# Patient Record
Sex: MALE | NOT HISPANIC OR LATINO | URBAN - METROPOLITAN AREA
[De-identification: names, ages, dates, MRNs, and addresses within clinical notes are randomized per-mention and may not be internally consistent; named-entity substitution may affect disease eponyms.]

---

## 2017-01-12 ENCOUNTER — OFFICE VISIT - HEALTHEAST (OUTPATIENT)
Dept: FAMILY MEDICINE | Facility: CLINIC | Age: 62
End: 2017-01-12

## 2017-01-12 DIAGNOSIS — I25.10 CORONARY ATHEROSCLEROSIS: ICD-10-CM

## 2017-01-12 DIAGNOSIS — I10 ESSENTIAL HYPERTENSION, BENIGN: ICD-10-CM

## 2017-01-12 DIAGNOSIS — E66.9 OBESITY: ICD-10-CM

## 2017-01-12 DIAGNOSIS — E78.00 PURE HYPERCHOLESTEROLEMIA: ICD-10-CM

## 2017-01-12 DIAGNOSIS — G47.33 OBSTRUCTIVE SLEEP APNEA (ADULT) (PEDIATRIC): ICD-10-CM

## 2017-01-12 DIAGNOSIS — E55.9 VITAMIN D DEFICIENCY: ICD-10-CM

## 2017-01-12 LAB
CHOLEST SERPL-MCNC: 118 MG/DL
FASTING STATUS PATIENT QL REPORTED: YES
HDLC SERPL-MCNC: 39 MG/DL
LDLC SERPL CALC-MCNC: 63 MG/DL
TRIGL SERPL-MCNC: 82 MG/DL

## 2017-01-12 ASSESSMENT — MIFFLIN-ST. JEOR: SCORE: 2087.61

## 2017-01-13 ENCOUNTER — COMMUNICATION - HEALTHEAST (OUTPATIENT)
Dept: FAMILY MEDICINE | Facility: CLINIC | Age: 62
End: 2017-01-13

## 2017-01-27 ENCOUNTER — COMMUNICATION - HEALTHEAST (OUTPATIENT)
Dept: FAMILY MEDICINE | Facility: CLINIC | Age: 62
End: 2017-01-27

## 2017-01-27 DIAGNOSIS — F33.41 RECURRENT MAJOR DEPRESSION IN PARTIAL REMISSION (H): ICD-10-CM

## 2017-01-30 ENCOUNTER — RECORDS - HEALTHEAST (OUTPATIENT)
Dept: ADMINISTRATIVE | Facility: OTHER | Age: 62
End: 2017-01-30

## 2017-02-19 ENCOUNTER — COMMUNICATION - HEALTHEAST (OUTPATIENT)
Dept: FAMILY MEDICINE | Facility: CLINIC | Age: 62
End: 2017-02-19

## 2017-02-19 DIAGNOSIS — E87.6 POTASSIUM DEFICIENCY: ICD-10-CM

## 2017-02-27 ENCOUNTER — OFFICE VISIT - HEALTHEAST (OUTPATIENT)
Dept: FAMILY MEDICINE | Facility: CLINIC | Age: 62
End: 2017-02-27

## 2017-02-27 DIAGNOSIS — M79.89 LEG SWELLING: ICD-10-CM

## 2017-02-27 DIAGNOSIS — I10 ESSENTIAL HYPERTENSION, BENIGN: ICD-10-CM

## 2017-03-10 ENCOUNTER — RECORDS - HEALTHEAST (OUTPATIENT)
Dept: ADMINISTRATIVE | Facility: OTHER | Age: 62
End: 2017-03-10

## 2017-03-10 ENCOUNTER — AMBULATORY - HEALTHEAST (OUTPATIENT)
Dept: LAB | Facility: CLINIC | Age: 62
End: 2017-03-10

## 2017-03-10 ENCOUNTER — OFFICE VISIT - HEALTHEAST (OUTPATIENT)
Dept: SURGERY | Facility: CLINIC | Age: 62
End: 2017-03-10

## 2017-03-10 ENCOUNTER — COMMUNICATION - HEALTHEAST (OUTPATIENT)
Dept: SURGERY | Facility: CLINIC | Age: 62
End: 2017-03-10

## 2017-03-10 DIAGNOSIS — H91.90 HARD OF HEARING: ICD-10-CM

## 2017-03-10 DIAGNOSIS — E66.01 MORBID OBESITY (H): ICD-10-CM

## 2017-03-10 DIAGNOSIS — M54.9 CHRONIC BACK PAIN: ICD-10-CM

## 2017-03-10 DIAGNOSIS — R60.0 LOWER LEG EDEMA: ICD-10-CM

## 2017-03-10 DIAGNOSIS — G89.29 CHRONIC BACK PAIN: ICD-10-CM

## 2017-03-10 DIAGNOSIS — M47.816 DEGENERATIVE JOINT DISEASE (DJD) OF LUMBAR SPINE: ICD-10-CM

## 2017-03-10 LAB — HBA1C MFR BLD: 6.9 % (ref 3.5–6)

## 2017-03-10 ASSESSMENT — MIFFLIN-ST. JEOR: SCORE: 2089.31

## 2017-03-13 ENCOUNTER — AMBULATORY - HEALTHEAST (OUTPATIENT)
Dept: SURGERY | Facility: CLINIC | Age: 62
End: 2017-03-13

## 2017-03-15 ENCOUNTER — AMBULATORY - HEALTHEAST (OUTPATIENT)
Dept: SURGERY | Facility: CLINIC | Age: 62
End: 2017-03-15

## 2017-03-15 ENCOUNTER — OFFICE VISIT - HEALTHEAST (OUTPATIENT)
Dept: SURGERY | Facility: CLINIC | Age: 62
End: 2017-03-15

## 2017-03-15 DIAGNOSIS — E60 LOW ZINC LEVEL: ICD-10-CM

## 2017-03-15 DIAGNOSIS — E66.01 MORBID OBESITY (H): ICD-10-CM

## 2017-03-16 ENCOUNTER — COMMUNICATION - HEALTHEAST (OUTPATIENT)
Dept: FAMILY MEDICINE | Facility: CLINIC | Age: 62
End: 2017-03-16

## 2017-03-16 DIAGNOSIS — E78.5 HYPERLIPIDEMIA: ICD-10-CM

## 2017-03-20 ENCOUNTER — COMMUNICATION - HEALTHEAST (OUTPATIENT)
Dept: FAMILY MEDICINE | Facility: CLINIC | Age: 62
End: 2017-03-20

## 2017-03-20 DIAGNOSIS — I25.10 CAD (CORONARY ARTERY DISEASE): ICD-10-CM

## 2017-04-06 ENCOUNTER — OFFICE VISIT - HEALTHEAST (OUTPATIENT)
Dept: SURGERY | Facility: CLINIC | Age: 62
End: 2017-04-06

## 2017-04-06 DIAGNOSIS — E66.01 MORBID OBESITY (H): ICD-10-CM

## 2017-04-11 ENCOUNTER — OFFICE VISIT - HEALTHEAST (OUTPATIENT)
Dept: SURGERY | Facility: CLINIC | Age: 62
End: 2017-04-11

## 2017-04-11 ENCOUNTER — RECORDS - HEALTHEAST (OUTPATIENT)
Dept: ADMINISTRATIVE | Facility: OTHER | Age: 62
End: 2017-04-11

## 2017-04-11 DIAGNOSIS — E66.9 OBESITY (BMI 35.0-39.9 WITHOUT COMORBIDITY): ICD-10-CM

## 2017-04-11 DIAGNOSIS — Z71.3 NUTRITIONAL COUNSELING: ICD-10-CM

## 2017-04-11 ASSESSMENT — MIFFLIN-ST. JEOR: SCORE: 2018.54

## 2017-05-09 ENCOUNTER — AMBULATORY - HEALTHEAST (OUTPATIENT)
Dept: LAB | Facility: CLINIC | Age: 62
End: 2017-05-09

## 2017-05-09 ENCOUNTER — COMMUNICATION - HEALTHEAST (OUTPATIENT)
Dept: LAB | Facility: CLINIC | Age: 62
End: 2017-05-09

## 2017-05-09 DIAGNOSIS — I10 HTN (HYPERTENSION): ICD-10-CM

## 2017-05-10 ENCOUNTER — COMMUNICATION - HEALTHEAST (OUTPATIENT)
Dept: FAMILY MEDICINE | Facility: CLINIC | Age: 62
End: 2017-05-10

## 2017-05-11 ENCOUNTER — OFFICE VISIT - HEALTHEAST (OUTPATIENT)
Dept: SURGERY | Facility: CLINIC | Age: 62
End: 2017-05-11

## 2017-05-11 DIAGNOSIS — E66.9 OBESITY WITH BODY MASS INDEX OF 30.0-39.9: ICD-10-CM

## 2017-05-11 DIAGNOSIS — Z71.3 NUTRITIONAL COUNSELING: ICD-10-CM

## 2017-05-11 ASSESSMENT — MIFFLIN-ST. JEOR: SCORE: 1971.82

## 2017-05-12 ENCOUNTER — AMBULATORY - HEALTHEAST (OUTPATIENT)
Dept: SURGERY | Facility: CLINIC | Age: 62
End: 2017-05-12

## 2017-05-23 ENCOUNTER — COMMUNICATION - HEALTHEAST (OUTPATIENT)
Dept: FAMILY MEDICINE | Facility: CLINIC | Age: 62
End: 2017-05-23

## 2017-05-23 DIAGNOSIS — I10 ESSENTIAL HYPERTENSION, BENIGN: ICD-10-CM

## 2017-05-24 ENCOUNTER — AMBULATORY - HEALTHEAST (OUTPATIENT)
Dept: SURGERY | Facility: CLINIC | Age: 62
End: 2017-05-24

## 2017-05-24 ENCOUNTER — OFFICE VISIT - HEALTHEAST (OUTPATIENT)
Dept: SURGERY | Facility: CLINIC | Age: 62
End: 2017-05-24

## 2017-05-24 DIAGNOSIS — E66.01 MORBID OBESITY (H): ICD-10-CM

## 2017-05-24 DIAGNOSIS — I10 ESSENTIAL HYPERTENSION: ICD-10-CM

## 2017-05-24 ASSESSMENT — MIFFLIN-ST. JEOR: SCORE: 2005.84

## 2017-05-25 ENCOUNTER — AMBULATORY - HEALTHEAST (OUTPATIENT)
Dept: SURGERY | Facility: CLINIC | Age: 62
End: 2017-05-25

## 2017-05-31 ENCOUNTER — RECORDS - HEALTHEAST (OUTPATIENT)
Dept: ADMINISTRATIVE | Facility: OTHER | Age: 62
End: 2017-05-31

## 2017-06-09 ENCOUNTER — AMBULATORY - HEALTHEAST (OUTPATIENT)
Dept: SURGERY | Facility: CLINIC | Age: 62
End: 2017-06-09

## 2017-06-19 ENCOUNTER — COMMUNICATION - HEALTHEAST (OUTPATIENT)
Dept: FAMILY MEDICINE | Facility: CLINIC | Age: 62
End: 2017-06-19

## 2017-06-19 DIAGNOSIS — I25.10 CAD (CORONARY ARTERY DISEASE): ICD-10-CM

## 2017-06-20 ENCOUNTER — AMBULATORY - HEALTHEAST (OUTPATIENT)
Dept: SURGERY | Facility: CLINIC | Age: 62
End: 2017-06-20

## 2017-06-20 DIAGNOSIS — Z01.818 PRE-OP TESTING: ICD-10-CM

## 2017-06-25 ENCOUNTER — COMMUNICATION - HEALTHEAST (OUTPATIENT)
Dept: FAMILY MEDICINE | Facility: CLINIC | Age: 62
End: 2017-06-25

## 2017-06-25 DIAGNOSIS — E78.5 HYPERLIPIDEMIA: ICD-10-CM

## 2017-06-29 ENCOUNTER — COMMUNICATION - HEALTHEAST (OUTPATIENT)
Dept: FAMILY MEDICINE | Facility: CLINIC | Age: 62
End: 2017-06-29

## 2017-06-29 DIAGNOSIS — I10 ESSENTIAL HYPERTENSION, BENIGN: ICD-10-CM

## 2017-07-07 ENCOUNTER — RECORDS - HEALTHEAST (OUTPATIENT)
Dept: ADMINISTRATIVE | Facility: OTHER | Age: 62
End: 2017-07-07

## 2017-07-17 ENCOUNTER — RECORDS - HEALTHEAST (OUTPATIENT)
Dept: GENERAL RADIOLOGY | Facility: CLINIC | Age: 62
End: 2017-07-17

## 2017-07-17 ENCOUNTER — OFFICE VISIT - HEALTHEAST (OUTPATIENT)
Dept: FAMILY MEDICINE | Facility: CLINIC | Age: 62
End: 2017-07-17

## 2017-07-17 DIAGNOSIS — E66.9 OBESITY: ICD-10-CM

## 2017-07-17 DIAGNOSIS — E78.00 PURE HYPERCHOLESTEROLEMIA: ICD-10-CM

## 2017-07-17 DIAGNOSIS — Z01.818 PRE-OP TESTING: ICD-10-CM

## 2017-07-17 DIAGNOSIS — E55.9 VITAMIN D DEFICIENCY: ICD-10-CM

## 2017-07-17 DIAGNOSIS — I10 ESSENTIAL HYPERTENSION, BENIGN: ICD-10-CM

## 2017-07-17 DIAGNOSIS — E11.9 TYPE 2 DIABETES MELLITUS (H): ICD-10-CM

## 2017-07-17 DIAGNOSIS — Z01.818 ENCOUNTER FOR OTHER PREPROCEDURAL EXAMINATION: ICD-10-CM

## 2017-07-17 DIAGNOSIS — I25.10 CORONARY ARTERY DISEASE: ICD-10-CM

## 2017-07-17 DIAGNOSIS — Z01.810 PREOP CARDIOVASCULAR EXAM: ICD-10-CM

## 2017-07-17 LAB
ATRIAL RATE - MUSE: 48 BPM
CHOLEST SERPL-MCNC: 122 MG/DL
DIASTOLIC BLOOD PRESSURE - MUSE: NORMAL MMHG
FASTING STATUS PATIENT QL REPORTED: YES
HBA1C MFR BLD: 6.7 % (ref 3.5–6)
HDLC SERPL-MCNC: 37 MG/DL
INTERPRETATION ECG - MUSE: NORMAL
LDLC SERPL CALC-MCNC: 63 MG/DL
P AXIS - MUSE: 25 DEGREES
PR INTERVAL - MUSE: 156 MS
QRS DURATION - MUSE: 86 MS
QT - MUSE: 448 MS
QTC - MUSE: 400 MS
R AXIS - MUSE: 34 DEGREES
SYSTOLIC BLOOD PRESSURE - MUSE: NORMAL MMHG
T AXIS - MUSE: 53 DEGREES
TRIGL SERPL-MCNC: 109 MG/DL
VENTRICULAR RATE- MUSE: 48 BPM

## 2017-07-17 ASSESSMENT — MIFFLIN-ST. JEOR: SCORE: 1979.88

## 2017-07-20 ENCOUNTER — AMBULATORY - HEALTHEAST (OUTPATIENT)
Dept: SURGERY | Facility: CLINIC | Age: 62
End: 2017-07-20

## 2017-07-20 DIAGNOSIS — Z98.84 BARIATRIC SURGERY STATUS: ICD-10-CM

## 2017-07-20 DIAGNOSIS — Z98.84 S/P BARIATRIC SURGERY: ICD-10-CM

## 2017-07-20 DIAGNOSIS — Z71.3 DIETARY COUNSELING: ICD-10-CM

## 2017-07-20 DIAGNOSIS — R63.4 RAPID WEIGHT LOSS: ICD-10-CM

## 2017-07-20 DIAGNOSIS — K21.9 ACID REFLUX: ICD-10-CM

## 2017-07-20 DIAGNOSIS — E66.9 OBESITY (BMI 30-39.9): ICD-10-CM

## 2017-07-20 ASSESSMENT — MIFFLIN-ST. JEOR
SCORE: 1984.98
SCORE: 1984.98

## 2017-08-03 ENCOUNTER — COMMUNICATION - HEALTHEAST (OUTPATIENT)
Dept: PULMONOLOGY | Facility: OTHER | Age: 62
End: 2017-08-03

## 2017-08-03 ENCOUNTER — AMBULATORY - HEALTHEAST (OUTPATIENT)
Dept: SLEEP MEDICINE | Facility: CLINIC | Age: 62
End: 2017-08-03

## 2017-08-03 DIAGNOSIS — G47.33 OSA (OBSTRUCTIVE SLEEP APNEA): ICD-10-CM

## 2017-08-04 ENCOUNTER — AMBULATORY - HEALTHEAST (OUTPATIENT)
Dept: SURGERY | Facility: CLINIC | Age: 62
End: 2017-08-04

## 2017-08-04 ENCOUNTER — RECORDS - HEALTHEAST (OUTPATIENT)
Dept: ADMINISTRATIVE | Facility: OTHER | Age: 62
End: 2017-08-04

## 2017-08-04 ENCOUNTER — COMMUNICATION - HEALTHEAST (OUTPATIENT)
Dept: FAMILY MEDICINE | Facility: CLINIC | Age: 62
End: 2017-08-04

## 2017-08-04 DIAGNOSIS — F33.41 RECURRENT MAJOR DEPRESSION IN PARTIAL REMISSION (H): ICD-10-CM

## 2017-08-04 DIAGNOSIS — Z01.818 PRE-OP TESTING: ICD-10-CM

## 2017-08-07 ENCOUNTER — ANESTHESIA - HEALTHEAST (OUTPATIENT)
Dept: SURGERY | Facility: CLINIC | Age: 62
End: 2017-08-07

## 2017-08-07 ENCOUNTER — SURGERY - HEALTHEAST (OUTPATIENT)
Dept: SURGERY | Facility: CLINIC | Age: 62
End: 2017-08-07

## 2017-08-07 ASSESSMENT — MIFFLIN-ST. JEOR
SCORE: 1936.79
SCORE: 1939.05

## 2017-08-08 ASSESSMENT — MIFFLIN-ST. JEOR: SCORE: 1947.91

## 2017-08-09 ASSESSMENT — MIFFLIN-ST. JEOR: SCORE: 1939.29

## 2017-08-11 ENCOUNTER — AMBULATORY - HEALTHEAST (OUTPATIENT)
Dept: SLEEP MEDICINE | Facility: CLINIC | Age: 62
End: 2017-08-11

## 2017-08-11 ENCOUNTER — OFFICE VISIT - HEALTHEAST (OUTPATIENT)
Dept: SLEEP MEDICINE | Facility: CLINIC | Age: 62
End: 2017-08-11

## 2017-08-11 ENCOUNTER — COMMUNICATION - HEALTHEAST (OUTPATIENT)
Dept: SURGERY | Facility: CLINIC | Age: 62
End: 2017-08-11

## 2017-08-11 DIAGNOSIS — G47.33 OSA ON CPAP: ICD-10-CM

## 2017-08-11 ASSESSMENT — MIFFLIN-ST. JEOR: SCORE: 1935.66

## 2017-08-15 ENCOUNTER — AMBULATORY - HEALTHEAST (OUTPATIENT)
Dept: SURGERY | Facility: CLINIC | Age: 62
End: 2017-08-15

## 2017-08-15 DIAGNOSIS — E66.9 OBESITY (BMI 30-39.9): ICD-10-CM

## 2017-08-15 DIAGNOSIS — Z71.3 DIETARY COUNSELING: ICD-10-CM

## 2017-08-15 DIAGNOSIS — Z98.84 BARIATRIC SURGERY STATUS: ICD-10-CM

## 2017-08-16 ENCOUNTER — RECORDS - HEALTHEAST (OUTPATIENT)
Dept: ADMINISTRATIVE | Facility: OTHER | Age: 62
End: 2017-08-16

## 2017-08-24 ENCOUNTER — OFFICE VISIT - HEALTHEAST (OUTPATIENT)
Dept: SURGERY | Facility: CLINIC | Age: 62
End: 2017-08-24

## 2017-08-24 ENCOUNTER — RECORDS - HEALTHEAST (OUTPATIENT)
Dept: ADMINISTRATIVE | Facility: OTHER | Age: 62
End: 2017-08-24

## 2017-08-24 DIAGNOSIS — Z48.89 POSTOPERATIVE VISIT: ICD-10-CM

## 2017-08-24 RX ORDER — OLMESARTAN MEDOXOMIL 20 MG/1
20 TABLET ORAL DAILY
Status: SHIPPED | COMMUNITY
Start: 2017-08-24

## 2017-08-24 ASSESSMENT — MIFFLIN-ST. JEOR: SCORE: 1876.69

## 2017-09-01 ENCOUNTER — RECORDS - HEALTHEAST (OUTPATIENT)
Dept: ADMINISTRATIVE | Facility: OTHER | Age: 62
End: 2017-09-01

## 2017-09-05 ENCOUNTER — COMMUNICATION - HEALTHEAST (OUTPATIENT)
Dept: FAMILY MEDICINE | Facility: CLINIC | Age: 62
End: 2017-09-05

## 2017-09-05 DIAGNOSIS — N40.1 BENIGN PROSTATIC HYPERPLASIA WITH LOWER URINARY TRACT SYMPTOMS, UNSPECIFIED MORPHOLOGY: ICD-10-CM

## 2017-09-07 ENCOUNTER — COMMUNICATION - HEALTHEAST (OUTPATIENT)
Dept: SURGERY | Facility: CLINIC | Age: 62
End: 2017-09-07

## 2017-09-13 ENCOUNTER — COMMUNICATION - HEALTHEAST (OUTPATIENT)
Dept: SURGERY | Facility: CLINIC | Age: 62
End: 2017-09-13

## 2017-10-01 ENCOUNTER — COMMUNICATION - HEALTHEAST (OUTPATIENT)
Dept: FAMILY MEDICINE | Facility: CLINIC | Age: 62
End: 2017-10-01

## 2017-10-01 DIAGNOSIS — I25.10 CAD (CORONARY ARTERY DISEASE): ICD-10-CM

## 2017-10-01 DIAGNOSIS — I10 ESSENTIAL HYPERTENSION, BENIGN: ICD-10-CM

## 2017-10-23 ENCOUNTER — OFFICE VISIT - HEALTHEAST (OUTPATIENT)
Dept: FAMILY MEDICINE | Facility: CLINIC | Age: 62
End: 2017-10-23

## 2017-10-23 DIAGNOSIS — E11.9 DIABETES MELLITUS (H): ICD-10-CM

## 2017-10-23 DIAGNOSIS — I10 ESSENTIAL HYPERTENSION: ICD-10-CM

## 2017-10-23 DIAGNOSIS — E78.00 PURE HYPERCHOLESTEROLEMIA: ICD-10-CM

## 2017-10-23 DIAGNOSIS — I25.10 CORONARY ARTERY DISEASE: ICD-10-CM

## 2017-10-23 DIAGNOSIS — Z23 NEED FOR IMMUNIZATION AGAINST INFLUENZA: ICD-10-CM

## 2017-10-23 LAB
CHOLEST SERPL-MCNC: 105 MG/DL
FASTING STATUS PATIENT QL REPORTED: YES
HBA1C MFR BLD: 5.6 % (ref 3.5–6)
HDLC SERPL-MCNC: 45 MG/DL
LDLC SERPL CALC-MCNC: 53 MG/DL
TRIGL SERPL-MCNC: 34 MG/DL

## 2017-10-23 ASSESSMENT — MIFFLIN-ST. JEOR: SCORE: 1780.86

## 2017-11-21 ENCOUNTER — OFFICE VISIT - HEALTHEAST (OUTPATIENT)
Dept: SURGERY | Facility: CLINIC | Age: 62
End: 2017-11-21

## 2017-11-21 DIAGNOSIS — Z98.84 BARIATRIC SURGERY STATUS: ICD-10-CM

## 2017-11-21 DIAGNOSIS — Z71.3 DIETARY COUNSELING: ICD-10-CM

## 2017-11-21 DIAGNOSIS — E66.3 OVERWEIGHT (BMI 25.0-29.9): ICD-10-CM

## 2017-11-21 ASSESSMENT — MIFFLIN-ST. JEOR: SCORE: 1745.15

## 2017-12-06 ENCOUNTER — COMMUNICATION - HEALTHEAST (OUTPATIENT)
Dept: FAMILY MEDICINE | Facility: CLINIC | Age: 62
End: 2017-12-06

## 2017-12-06 DIAGNOSIS — N40.1 BENIGN PROSTATIC HYPERPLASIA WITH NOCTURIA: ICD-10-CM

## 2017-12-06 DIAGNOSIS — R35.1 BENIGN PROSTATIC HYPERPLASIA WITH NOCTURIA: ICD-10-CM

## 2017-12-26 ENCOUNTER — COMMUNICATION - HEALTHEAST (OUTPATIENT)
Dept: FAMILY MEDICINE | Facility: CLINIC | Age: 62
End: 2017-12-26

## 2017-12-26 DIAGNOSIS — I10 ESSENTIAL HYPERTENSION, BENIGN: ICD-10-CM

## 2017-12-27 ENCOUNTER — COMMUNICATION - HEALTHEAST (OUTPATIENT)
Dept: FAMILY MEDICINE | Facility: CLINIC | Age: 62
End: 2017-12-27

## 2018-01-22 ENCOUNTER — RECORDS - HEALTHEAST (OUTPATIENT)
Dept: ADMINISTRATIVE | Facility: OTHER | Age: 63
End: 2018-01-22

## 2018-01-23 ENCOUNTER — RECORDS - HEALTHEAST (OUTPATIENT)
Dept: ADMINISTRATIVE | Facility: OTHER | Age: 63
End: 2018-01-23

## 2018-01-25 ENCOUNTER — RECORDS - HEALTHEAST (OUTPATIENT)
Dept: ADMINISTRATIVE | Facility: OTHER | Age: 63
End: 2018-01-25

## 2018-01-26 ENCOUNTER — RECORDS - HEALTHEAST (OUTPATIENT)
Dept: ADMINISTRATIVE | Facility: OTHER | Age: 63
End: 2018-01-26

## 2018-01-28 ENCOUNTER — RECORDS - HEALTHEAST (OUTPATIENT)
Dept: ADMINISTRATIVE | Facility: OTHER | Age: 63
End: 2018-01-28

## 2018-02-07 ENCOUNTER — OFFICE VISIT - HEALTHEAST (OUTPATIENT)
Dept: FAMILY MEDICINE | Facility: CLINIC | Age: 63
End: 2018-02-07

## 2018-02-07 DIAGNOSIS — I10 ESSENTIAL HYPERTENSION: ICD-10-CM

## 2018-02-07 DIAGNOSIS — I48.0 PAROXYSMAL ATRIAL FIBRILLATION (H): ICD-10-CM

## 2018-02-07 DIAGNOSIS — I25.10 CORONARY ATHEROSCLEROSIS: ICD-10-CM

## 2018-02-07 DIAGNOSIS — F33.41 RECURRENT MAJOR DEPRESSION IN PARTIAL REMISSION (H): ICD-10-CM

## 2018-02-14 ENCOUNTER — OFFICE VISIT - HEALTHEAST (OUTPATIENT)
Dept: SURGERY | Facility: CLINIC | Age: 63
End: 2018-02-14

## 2018-02-14 DIAGNOSIS — K91.2 POSTOPERATIVE MALABSORPTION: ICD-10-CM

## 2018-02-14 ASSESSMENT — MIFFLIN-ST. JEOR: SCORE: 1749.68

## 2018-03-06 ENCOUNTER — COMMUNICATION - HEALTHEAST (OUTPATIENT)
Dept: FAMILY MEDICINE | Facility: CLINIC | Age: 63
End: 2018-03-06

## 2018-03-06 DIAGNOSIS — R35.1 BENIGN PROSTATIC HYPERPLASIA WITH NOCTURIA: ICD-10-CM

## 2018-03-06 DIAGNOSIS — N40.1 BENIGN PROSTATIC HYPERPLASIA WITH NOCTURIA: ICD-10-CM

## 2018-03-28 ENCOUNTER — OFFICE VISIT - HEALTHEAST (OUTPATIENT)
Dept: FAMILY MEDICINE | Facility: CLINIC | Age: 63
End: 2018-03-28

## 2018-03-28 DIAGNOSIS — I25.10 CORONARY ARTERY DISEASE: ICD-10-CM

## 2018-03-28 DIAGNOSIS — E78.00 PURE HYPERCHOLESTEROLEMIA: ICD-10-CM

## 2018-03-28 DIAGNOSIS — I10 ESSENTIAL HYPERTENSION: ICD-10-CM

## 2018-03-28 DIAGNOSIS — E11.9 DIABETES MELLITUS (H): ICD-10-CM

## 2018-03-28 DIAGNOSIS — F33.41 RECURRENT MAJOR DEPRESSION IN PARTIAL REMISSION (H): ICD-10-CM

## 2018-03-28 DIAGNOSIS — Z12.11 SCREEN FOR COLON CANCER: ICD-10-CM

## 2018-03-28 DIAGNOSIS — K91.2 POSTOPERATIVE MALABSORPTION: ICD-10-CM

## 2018-03-28 LAB
ALBUMIN SERPL-MCNC: 3.7 G/DL (ref 3.5–5)
ALP SERPL-CCNC: 88 U/L (ref 45–120)
ALT SERPL W P-5'-P-CCNC: 23 U/L (ref 0–45)
ANION GAP SERPL CALCULATED.3IONS-SCNC: 8 MMOL/L (ref 5–18)
AST SERPL W P-5'-P-CCNC: 17 U/L (ref 0–40)
BILIRUB SERPL-MCNC: 1 MG/DL (ref 0–1)
BUN SERPL-MCNC: 17 MG/DL (ref 8–22)
CALCIUM SERPL-MCNC: 9.7 MG/DL (ref 8.5–10.5)
CHLORIDE BLD-SCNC: 104 MMOL/L (ref 98–107)
CHOLEST SERPL-MCNC: 140 MG/DL
CO2 SERPL-SCNC: 29 MMOL/L (ref 22–31)
CREAT SERPL-MCNC: 0.82 MG/DL (ref 0.7–1.3)
ERYTHROCYTE [DISTWIDTH] IN BLOOD BY AUTOMATED COUNT: 13 % (ref 11–14.5)
FASTING STATUS PATIENT QL REPORTED: NORMAL
FERRITIN SERPL-MCNC: 42 NG/ML (ref 27–300)
FOLATE SERPL-MCNC: 17.3 NG/ML
GFR SERPL CREATININE-BSD FRML MDRD: >60 ML/MIN/1.73M2
GLUCOSE BLD-MCNC: 85 MG/DL (ref 70–125)
HBA1C MFR BLD: 5.9 % (ref 3.5–6)
HCT VFR BLD AUTO: 41.8 % (ref 40–54)
HDLC SERPL-MCNC: 63 MG/DL
HGB BLD-MCNC: 14.2 G/DL (ref 14–18)
LDLC SERPL CALC-MCNC: 65 MG/DL
MCH RBC QN AUTO: 31 PG (ref 27–34)
MCHC RBC AUTO-ENTMCNC: 33.9 G/DL (ref 32–36)
MCV RBC AUTO: 91 FL (ref 80–100)
PLATELET # BLD AUTO: 183 THOU/UL (ref 140–440)
PMV BLD AUTO: 7.7 FL (ref 7–10)
POTASSIUM BLD-SCNC: 4.3 MMOL/L (ref 3.5–5)
PROT SERPL-MCNC: 6.4 G/DL (ref 6–8)
PTH-INTACT SERPL-MCNC: 72 PG/ML (ref 10–86)
RBC # BLD AUTO: 4.57 MILL/UL (ref 4.4–6.2)
SODIUM SERPL-SCNC: 141 MMOL/L (ref 136–145)
TRIGL SERPL-MCNC: 60 MG/DL
VIT B12 SERPL-MCNC: 1379 PG/ML (ref 213–816)
WBC: 4.9 THOU/UL (ref 4–11)

## 2018-03-29 LAB — 25(OH)D3 SERPL-MCNC: 55.7 NG/ML (ref 30–80)

## 2018-03-30 LAB
ANNOTATION COMMENT IMP: NORMAL
VIT A SERPL-MCNC: 0.47 MG/L (ref 0.3–1.2)
VITAMIN A (RETINYL PALMITATE): 0.02 MG/L (ref 0–0.1)
ZINC SERPL-MCNC: 80 UG/DL (ref 60–120)

## 2018-04-01 LAB — VIT B1 PYROPHOSHATE BLD-SCNC: 153 NMOL/L (ref 70–180)

## 2018-04-04 ENCOUNTER — COMMUNICATION - HEALTHEAST (OUTPATIENT)
Dept: FAMILY MEDICINE | Facility: CLINIC | Age: 63
End: 2018-04-04

## 2018-04-04 DIAGNOSIS — I10 ESSENTIAL HYPERTENSION, BENIGN: ICD-10-CM

## 2018-04-06 ENCOUNTER — COMMUNICATION - HEALTHEAST (OUTPATIENT)
Dept: SURGERY | Facility: CLINIC | Age: 63
End: 2018-04-06

## 2018-04-30 ENCOUNTER — OFFICE VISIT - HEALTHEAST (OUTPATIENT)
Dept: FAMILY MEDICINE | Facility: CLINIC | Age: 63
End: 2018-04-30

## 2018-04-30 DIAGNOSIS — G47.00 INSOMNIA: ICD-10-CM

## 2018-04-30 DIAGNOSIS — I10 ESSENTIAL HYPERTENSION: ICD-10-CM

## 2018-04-30 LAB
ANION GAP SERPL CALCULATED.3IONS-SCNC: 6 MMOL/L (ref 5–18)
BUN SERPL-MCNC: 18 MG/DL (ref 8–22)
CALCIUM SERPL-MCNC: 9.6 MG/DL (ref 8.5–10.5)
CHLORIDE BLD-SCNC: 107 MMOL/L (ref 98–107)
CO2 SERPL-SCNC: 29 MMOL/L (ref 22–31)
CREAT SERPL-MCNC: 0.77 MG/DL (ref 0.7–1.3)
GFR SERPL CREATININE-BSD FRML MDRD: >60 ML/MIN/1.73M2
GLUCOSE BLD-MCNC: 83 MG/DL (ref 70–125)
POTASSIUM BLD-SCNC: 4 MMOL/L (ref 3.5–5)
SODIUM SERPL-SCNC: 142 MMOL/L (ref 136–145)

## 2018-04-30 ASSESSMENT — MIFFLIN-ST. JEOR: SCORE: 1763.29

## 2018-05-18 ENCOUNTER — OFFICE VISIT - HEALTHEAST (OUTPATIENT)
Dept: SURGERY | Facility: CLINIC | Age: 63
End: 2018-05-18

## 2018-05-18 DIAGNOSIS — Z71.3 DIETARY COUNSELING: ICD-10-CM

## 2018-05-18 DIAGNOSIS — I10 ESSENTIAL HYPERTENSION: ICD-10-CM

## 2018-05-18 DIAGNOSIS — E66.3 OVERWEIGHT (BMI 25.0-29.9): ICD-10-CM

## 2018-05-18 DIAGNOSIS — Z98.84 BARIATRIC SURGERY STATUS: ICD-10-CM

## 2018-05-18 ASSESSMENT — MIFFLIN-ST. JEOR: SCORE: 1740.61

## 2018-05-21 ENCOUNTER — OFFICE VISIT - HEALTHEAST (OUTPATIENT)
Dept: FAMILY MEDICINE | Facility: CLINIC | Age: 63
End: 2018-05-21

## 2018-05-21 DIAGNOSIS — G47.00 INSOMNIA: ICD-10-CM

## 2018-05-21 DIAGNOSIS — I10 ESSENTIAL HYPERTENSION: ICD-10-CM

## 2018-05-21 LAB
ANION GAP SERPL CALCULATED.3IONS-SCNC: 9 MMOL/L (ref 5–18)
BUN SERPL-MCNC: 21 MG/DL (ref 8–22)
CALCIUM SERPL-MCNC: 9.4 MG/DL (ref 8.5–10.5)
CHLORIDE BLD-SCNC: 104 MMOL/L (ref 98–107)
CO2 SERPL-SCNC: 27 MMOL/L (ref 22–31)
CREAT SERPL-MCNC: 0.87 MG/DL (ref 0.7–1.3)
GFR SERPL CREATININE-BSD FRML MDRD: >60 ML/MIN/1.73M2
GLUCOSE BLD-MCNC: 86 MG/DL (ref 70–125)
POTASSIUM BLD-SCNC: 3.9 MMOL/L (ref 3.5–5)
SODIUM SERPL-SCNC: 140 MMOL/L (ref 136–145)

## 2018-06-05 ENCOUNTER — RECORDS - HEALTHEAST (OUTPATIENT)
Dept: ADMINISTRATIVE | Facility: OTHER | Age: 63
End: 2018-06-05

## 2018-06-16 ENCOUNTER — COMMUNICATION - HEALTHEAST (OUTPATIENT)
Dept: FAMILY MEDICINE | Facility: CLINIC | Age: 63
End: 2018-06-16

## 2018-06-16 DIAGNOSIS — I10 ESSENTIAL HYPERTENSION: ICD-10-CM

## 2018-06-16 DIAGNOSIS — N40.1 BENIGN PROSTATIC HYPERPLASIA WITH NOCTURIA: ICD-10-CM

## 2018-06-16 DIAGNOSIS — R35.1 BENIGN PROSTATIC HYPERPLASIA WITH NOCTURIA: ICD-10-CM

## 2018-06-22 ENCOUNTER — COMMUNICATION - HEALTHEAST (OUTPATIENT)
Dept: SLEEP MEDICINE | Facility: CLINIC | Age: 63
End: 2018-06-22

## 2018-07-11 ENCOUNTER — COMMUNICATION - HEALTHEAST (OUTPATIENT)
Dept: FAMILY MEDICINE | Facility: CLINIC | Age: 63
End: 2018-07-11

## 2018-07-11 ENCOUNTER — AMBULATORY - HEALTHEAST (OUTPATIENT)
Dept: SURGERY | Facility: CLINIC | Age: 63
End: 2018-07-11

## 2018-07-11 DIAGNOSIS — Z98.84 S/P BARIATRIC SURGERY: ICD-10-CM

## 2018-07-11 DIAGNOSIS — K90.9 INTESTINAL MALABSORPTION, UNSPECIFIED TYPE: ICD-10-CM

## 2018-07-11 DIAGNOSIS — E78.00 PURE HYPERCHOLESTEROLEMIA: ICD-10-CM

## 2018-07-11 DIAGNOSIS — K91.2 POSTSURGICAL MALABSORPTION: ICD-10-CM

## 2018-08-08 ENCOUNTER — AMBULATORY - HEALTHEAST (OUTPATIENT)
Dept: LAB | Facility: CLINIC | Age: 63
End: 2018-08-08

## 2018-08-08 ENCOUNTER — OFFICE VISIT - HEALTHEAST (OUTPATIENT)
Dept: SURGERY | Facility: CLINIC | Age: 63
End: 2018-08-08

## 2018-08-08 DIAGNOSIS — K90.9 INTESTINAL MALABSORPTION, UNSPECIFIED TYPE: ICD-10-CM

## 2018-08-08 DIAGNOSIS — R10.11 RUQ ABDOMINAL PAIN: ICD-10-CM

## 2018-08-08 DIAGNOSIS — K91.2 POSTSURGICAL MALABSORPTION: ICD-10-CM

## 2018-08-08 DIAGNOSIS — Z98.84 S/P BARIATRIC SURGERY: ICD-10-CM

## 2018-08-08 LAB
ALBUMIN SERPL-MCNC: 3.8 G/DL (ref 3.5–5)
ALP SERPL-CCNC: 83 U/L (ref 45–120)
ALT SERPL W P-5'-P-CCNC: 17 U/L (ref 0–45)
ANION GAP SERPL CALCULATED.3IONS-SCNC: 9 MMOL/L (ref 5–18)
AST SERPL W P-5'-P-CCNC: 14 U/L (ref 0–40)
BILIRUB SERPL-MCNC: 0.5 MG/DL (ref 0–1)
BUN SERPL-MCNC: 23 MG/DL (ref 8–22)
CALCIUM SERPL-MCNC: 9.4 MG/DL (ref 8.5–10.5)
CHLORIDE BLD-SCNC: 105 MMOL/L (ref 98–107)
CO2 SERPL-SCNC: 26 MMOL/L (ref 22–31)
CREAT SERPL-MCNC: 0.93 MG/DL (ref 0.7–1.3)
ERYTHROCYTE [DISTWIDTH] IN BLOOD BY AUTOMATED COUNT: 11.7 % (ref 11–14.5)
FERRITIN SERPL-MCNC: 72 NG/ML (ref 27–300)
FOLATE SERPL-MCNC: 16.3 NG/ML
GFR SERPL CREATININE-BSD FRML MDRD: >60 ML/MIN/1.73M2
GLUCOSE BLD-MCNC: 96 MG/DL (ref 70–125)
HBA1C MFR BLD: 5.9 % (ref 3.5–6)
HCT VFR BLD AUTO: 40.4 % (ref 40–54)
HGB BLD-MCNC: 13.6 G/DL (ref 14–18)
MCH RBC QN AUTO: 30.9 PG (ref 27–34)
MCHC RBC AUTO-ENTMCNC: 33.7 G/DL (ref 32–36)
MCV RBC AUTO: 92 FL (ref 80–100)
PLATELET # BLD AUTO: 204 THOU/UL (ref 140–440)
PMV BLD AUTO: 7.7 FL (ref 7–10)
POTASSIUM BLD-SCNC: 4.5 MMOL/L (ref 3.5–5)
PROT SERPL-MCNC: 6.4 G/DL (ref 6–8)
PTH-INTACT SERPL-MCNC: 96 PG/ML (ref 10–86)
RBC # BLD AUTO: 4.4 MILL/UL (ref 4.4–6.2)
SODIUM SERPL-SCNC: 140 MMOL/L (ref 136–145)
VIT B12 SERPL-MCNC: 1058 PG/ML (ref 213–816)
WBC: 6.5 THOU/UL (ref 4–11)

## 2018-08-08 ASSESSMENT — MIFFLIN-ST. JEOR: SCORE: 1772.36

## 2018-08-09 LAB — 25(OH)D3 SERPL-MCNC: 38.6 NG/ML (ref 30–80)

## 2018-08-11 LAB — ZINC SERPL-MCNC: 49 UG/DL (ref 60–120)

## 2018-08-12 LAB
ANNOTATION COMMENT IMP: NORMAL
VIT A SERPL-MCNC: 0.47 MG/L (ref 0.3–1.2)
VITAMIN A (RETINYL PALMITATE): 0.02 MG/L (ref 0–0.1)

## 2018-08-14 ENCOUNTER — COMMUNICATION - HEALTHEAST (OUTPATIENT)
Dept: SURGERY | Facility: CLINIC | Age: 63
End: 2018-08-14

## 2018-08-14 ENCOUNTER — AMBULATORY - HEALTHEAST (OUTPATIENT)
Dept: SURGERY | Facility: CLINIC | Age: 63
End: 2018-08-14

## 2018-08-14 ENCOUNTER — HOSPITAL ENCOUNTER (OUTPATIENT)
Dept: ULTRASOUND IMAGING | Facility: CLINIC | Age: 63
Discharge: HOME | End: 2018-08-14
Attending: FAMILY MEDICINE

## 2018-08-14 DIAGNOSIS — K91.2 POSTOPERATIVE MALABSORPTION: ICD-10-CM

## 2018-08-14 DIAGNOSIS — E60 ZINC DEFICIENCY: ICD-10-CM

## 2018-08-14 DIAGNOSIS — R10.9 ABDOMINAL DISCOMFORT: ICD-10-CM

## 2018-08-14 DIAGNOSIS — R10.11 RUQ ABDOMINAL PAIN: ICD-10-CM

## 2018-08-14 LAB — VIT B1 PYROPHOSHATE BLD-SCNC: 159 NMOL/L (ref 70–180)

## 2018-08-16 ENCOUNTER — COMMUNICATION - HEALTHEAST (OUTPATIENT)
Dept: FAMILY MEDICINE | Facility: CLINIC | Age: 63
End: 2018-08-16

## 2018-08-16 DIAGNOSIS — F33.41 RECURRENT MAJOR DEPRESSION IN PARTIAL REMISSION (H): ICD-10-CM

## 2018-09-24 ENCOUNTER — OFFICE VISIT - HEALTHEAST (OUTPATIENT)
Dept: FAMILY MEDICINE | Facility: CLINIC | Age: 63
End: 2018-09-24

## 2018-09-24 DIAGNOSIS — I10 ESSENTIAL HYPERTENSION: ICD-10-CM

## 2018-09-24 DIAGNOSIS — I25.10 CORONARY ARTERY DISEASE: ICD-10-CM

## 2018-09-24 DIAGNOSIS — G47.00 INSOMNIA: ICD-10-CM

## 2018-09-24 DIAGNOSIS — Z23 NEED FOR VACCINATION: ICD-10-CM

## 2018-09-24 DIAGNOSIS — E78.00 PURE HYPERCHOLESTEROLEMIA: ICD-10-CM

## 2018-09-24 DIAGNOSIS — E11.9 DIABETES MELLITUS (H): ICD-10-CM

## 2018-09-24 LAB
ALBUMIN SERPL-MCNC: 3.8 G/DL (ref 3.5–5)
ALP SERPL-CCNC: 83 U/L (ref 45–120)
ALT SERPL W P-5'-P-CCNC: 19 U/L (ref 0–45)
ANION GAP SERPL CALCULATED.3IONS-SCNC: 7 MMOL/L (ref 5–18)
AST SERPL W P-5'-P-CCNC: 15 U/L (ref 0–40)
BILIRUB DIRECT SERPL-MCNC: 0.3 MG/DL
BILIRUB SERPL-MCNC: 0.7 MG/DL (ref 0–1)
BUN SERPL-MCNC: 18 MG/DL (ref 8–22)
CALCIUM SERPL-MCNC: 9.4 MG/DL (ref 8.5–10.5)
CHLORIDE BLD-SCNC: 106 MMOL/L (ref 98–107)
CHOLEST SERPL-MCNC: 107 MG/DL
CO2 SERPL-SCNC: 29 MMOL/L (ref 22–31)
CREAT SERPL-MCNC: 0.79 MG/DL (ref 0.7–1.3)
FASTING STATUS PATIENT QL REPORTED: YES
GFR SERPL CREATININE-BSD FRML MDRD: >60 ML/MIN/1.73M2
GLUCOSE BLD-MCNC: 87 MG/DL (ref 70–125)
HDLC SERPL-MCNC: 50 MG/DL
LDLC SERPL CALC-MCNC: 48 MG/DL
POTASSIUM BLD-SCNC: 3.8 MMOL/L (ref 3.5–5)
PROT SERPL-MCNC: 6.2 G/DL (ref 6–8)
SODIUM SERPL-SCNC: 142 MMOL/L (ref 136–145)
TRIGL SERPL-MCNC: 43 MG/DL

## 2018-10-10 ENCOUNTER — RECORDS - HEALTHEAST (OUTPATIENT)
Dept: ADMINISTRATIVE | Facility: OTHER | Age: 63
End: 2018-10-10

## 2018-10-24 ENCOUNTER — OFFICE VISIT - HEALTHEAST (OUTPATIENT)
Dept: SLEEP MEDICINE | Facility: CLINIC | Age: 63
End: 2018-10-24

## 2018-10-24 ENCOUNTER — AMBULATORY - HEALTHEAST (OUTPATIENT)
Dept: SLEEP MEDICINE | Facility: CLINIC | Age: 63
End: 2018-10-24

## 2018-10-24 DIAGNOSIS — G47.33 OSA ON CPAP: ICD-10-CM

## 2018-10-24 ASSESSMENT — MIFFLIN-ST. JEOR: SCORE: 1776.9

## 2018-11-07 ENCOUNTER — HOSPITAL ENCOUNTER (OUTPATIENT)
Dept: CT IMAGING | Facility: CLINIC | Age: 63
Discharge: HOME | End: 2018-11-07
Attending: FAMILY MEDICINE

## 2018-11-07 ENCOUNTER — OFFICE VISIT - HEALTHEAST (OUTPATIENT)
Dept: FAMILY MEDICINE | Facility: CLINIC | Age: 63
End: 2018-11-07

## 2018-11-07 DIAGNOSIS — R31.9 HEMATURIA: ICD-10-CM

## 2018-11-07 LAB
ALBUMIN UR-MCNC: ABNORMAL MG/DL
ANION GAP SERPL CALCULATED.3IONS-SCNC: 8 MMOL/L (ref 5–18)
APPEARANCE UR: ABNORMAL
BILIRUB UR QL STRIP: NEGATIVE
BUN SERPL-MCNC: 24 MG/DL (ref 8–22)
CALCIUM SERPL-MCNC: 9.7 MG/DL (ref 8.5–10.5)
CHLORIDE BLD-SCNC: 105 MMOL/L (ref 98–107)
CO2 SERPL-SCNC: 27 MMOL/L (ref 22–31)
COLOR UR AUTO: ABNORMAL
CREAT BLD-MCNC: 1 MG/DL
CREAT SERPL-MCNC: 0.89 MG/DL (ref 0.7–1.3)
ERYTHROCYTE [DISTWIDTH] IN BLOOD BY AUTOMATED COUNT: 11 % (ref 11–14.5)
GFR SERPL CREATININE-BSD FRML MDRD: >60 ML/MIN/1.73M2
GLUCOSE BLD-MCNC: 101 MG/DL (ref 70–125)
GLUCOSE UR STRIP-MCNC: NEGATIVE MG/DL
HCT VFR BLD AUTO: 41.9 % (ref 40–54)
HGB BLD-MCNC: 14.2 G/DL (ref 14–18)
HGB UR QL STRIP: ABNORMAL
KETONES UR STRIP-MCNC: NEGATIVE MG/DL
LEUKOCYTE ESTERASE UR QL STRIP: ABNORMAL
MCH RBC QN AUTO: 31.4 PG (ref 27–34)
MCHC RBC AUTO-ENTMCNC: 33.9 G/DL (ref 32–36)
MCV RBC AUTO: 93 FL (ref 80–100)
NITRATE UR QL: NEGATIVE
PH UR STRIP: 5.5 [PH] (ref 5–8)
PLATELET # BLD AUTO: 215 THOU/UL (ref 140–440)
PMV BLD AUTO: 7.4 FL (ref 7–10)
POC GFR AMER AF HE - HISTORICAL: >60 ML/MIN/1.73M2
POC GFR NON AMER AF HE - HISTORICAL: >60 ML/MIN/1.73M2
POTASSIUM BLD-SCNC: 4.3 MMOL/L (ref 3.5–5)
RBC # BLD AUTO: 4.52 MILL/UL (ref 4.4–6.2)
SODIUM SERPL-SCNC: 140 MMOL/L (ref 136–145)
SP GR UR STRIP: 1.02 (ref 1–1.03)
UROBILINOGEN UR STRIP-ACNC: ABNORMAL
WBC: 8.6 THOU/UL (ref 4–11)

## 2018-11-08 LAB — BACTERIA SPEC CULT: NO GROWTH

## 2018-11-15 ENCOUNTER — COMMUNICATION - HEALTHEAST (OUTPATIENT)
Dept: FAMILY MEDICINE | Facility: CLINIC | Age: 63
End: 2018-11-15

## 2018-11-15 ENCOUNTER — RECORDS - HEALTHEAST (OUTPATIENT)
Dept: ADMINISTRATIVE | Facility: OTHER | Age: 63
End: 2018-11-15

## 2018-12-02 ENCOUNTER — COMMUNICATION - HEALTHEAST (OUTPATIENT)
Dept: FAMILY MEDICINE | Facility: CLINIC | Age: 63
End: 2018-12-02

## 2018-12-02 DIAGNOSIS — G47.00 INSOMNIA: ICD-10-CM

## 2018-12-03 ENCOUNTER — RECORDS - HEALTHEAST (OUTPATIENT)
Dept: ADMINISTRATIVE | Facility: OTHER | Age: 63
End: 2018-12-03

## 2018-12-03 ENCOUNTER — HOSPITAL ENCOUNTER (OUTPATIENT)
Dept: CT IMAGING | Facility: CLINIC | Age: 63
Discharge: HOME | End: 2018-12-03
Attending: UROLOGY

## 2018-12-03 DIAGNOSIS — N20.1 CALCULUS OF URETER: ICD-10-CM

## 2018-12-27 ENCOUNTER — COMMUNICATION - HEALTHEAST (OUTPATIENT)
Dept: SURGERY | Facility: CLINIC | Age: 63
End: 2018-12-27

## 2018-12-27 DIAGNOSIS — K91.2 POSTSURGICAL MALABSORPTION: ICD-10-CM

## 2018-12-27 DIAGNOSIS — K90.9 INTESTINAL MALABSORPTION, UNSPECIFIED: ICD-10-CM

## 2018-12-27 DIAGNOSIS — Z98.84 S/P BARIATRIC SURGERY: ICD-10-CM

## 2019-02-04 ENCOUNTER — OFFICE VISIT - HEALTHEAST (OUTPATIENT)
Dept: FAMILY MEDICINE | Facility: CLINIC | Age: 64
End: 2019-02-04

## 2019-02-04 DIAGNOSIS — E11.9 TYPE 2 DIABETES MELLITUS WITHOUT COMPLICATION, WITHOUT LONG-TERM CURRENT USE OF INSULIN (H): ICD-10-CM

## 2019-02-04 DIAGNOSIS — I10 ESSENTIAL HYPERTENSION: ICD-10-CM

## 2019-02-04 DIAGNOSIS — I25.10 CORONARY ARTERY DISEASE INVOLVING NATIVE HEART WITHOUT ANGINA PECTORIS, UNSPECIFIED VESSEL OR LESION TYPE: ICD-10-CM

## 2019-02-04 DIAGNOSIS — E78.00 PURE HYPERCHOLESTEROLEMIA: ICD-10-CM

## 2019-02-04 DIAGNOSIS — G47.33 OSA ON CPAP: ICD-10-CM

## 2019-02-04 LAB
CHOLEST SERPL-MCNC: 121 MG/DL
FASTING STATUS PATIENT QL REPORTED: YES
HBA1C MFR BLD: 5.7 % (ref 3.5–6)
HDLC SERPL-MCNC: 55 MG/DL
LDLC SERPL CALC-MCNC: 48 MG/DL
TRIGL SERPL-MCNC: 88 MG/DL

## 2019-02-04 ASSESSMENT — MIFFLIN-ST. JEOR: SCORE: 1785.97

## 2019-02-07 ENCOUNTER — AMBULATORY - HEALTHEAST (OUTPATIENT)
Dept: LAB | Facility: CLINIC | Age: 64
End: 2019-02-07

## 2019-02-07 ENCOUNTER — OFFICE VISIT - HEALTHEAST (OUTPATIENT)
Dept: SURGERY | Facility: CLINIC | Age: 64
End: 2019-02-07

## 2019-02-07 ENCOUNTER — HOSPITAL ENCOUNTER (OUTPATIENT)
Dept: LAB | Age: 64
Setting detail: SPECIMEN
Discharge: HOME | End: 2019-02-07

## 2019-02-07 DIAGNOSIS — Z98.84 S/P BARIATRIC SURGERY: ICD-10-CM

## 2019-02-07 DIAGNOSIS — K90.9 INTESTINAL MALABSORPTION, UNSPECIFIED: ICD-10-CM

## 2019-02-07 DIAGNOSIS — K91.2 POSTSURGICAL MALABSORPTION: ICD-10-CM

## 2019-02-07 DIAGNOSIS — K91.2 POSTOPERATIVE MALABSORPTION: ICD-10-CM

## 2019-02-07 LAB
ALBUMIN SERPL-MCNC: 3.8 G/DL (ref 3.5–5)
ALP SERPL-CCNC: 86 U/L (ref 45–120)
ALT SERPL W P-5'-P-CCNC: 18 U/L (ref 0–45)
ANION GAP SERPL CALCULATED.3IONS-SCNC: 9 MMOL/L (ref 5–18)
AST SERPL W P-5'-P-CCNC: 16 U/L (ref 0–40)
BILIRUB SERPL-MCNC: 0.6 MG/DL (ref 0–1)
BUN SERPL-MCNC: 18 MG/DL (ref 8–22)
CALCIUM SERPL-MCNC: 9.8 MG/DL (ref 8.5–10.5)
CHLORIDE BLD-SCNC: 104 MMOL/L (ref 98–107)
CO2 SERPL-SCNC: 27 MMOL/L (ref 22–31)
CREAT SERPL-MCNC: 0.86 MG/DL (ref 0.7–1.3)
ERYTHROCYTE [DISTWIDTH] IN BLOOD BY AUTOMATED COUNT: 11.9 % (ref 11–14.5)
FERRITIN SERPL-MCNC: 48 NG/ML (ref 27–300)
GFR SERPL CREATININE-BSD FRML MDRD: >60 ML/MIN/1.73M2
GLUCOSE BLD-MCNC: 88 MG/DL (ref 70–125)
HCT VFR BLD AUTO: 42.2 % (ref 40–54)
HGB BLD-MCNC: 14.4 G/DL (ref 14–18)
MCH RBC QN AUTO: 31.8 PG (ref 27–34)
MCHC RBC AUTO-ENTMCNC: 34.1 G/DL (ref 32–36)
MCV RBC AUTO: 93 FL (ref 80–100)
PLATELET # BLD AUTO: 192 THOU/UL (ref 140–440)
PMV BLD AUTO: 7.2 FL (ref 7–10)
POTASSIUM BLD-SCNC: 4.8 MMOL/L (ref 3.5–5)
PROT SERPL-MCNC: 6.6 G/DL (ref 6–8)
PTH-INTACT SERPL-MCNC: 88 PG/ML (ref 10–86)
RBC # BLD AUTO: 4.52 MILL/UL (ref 4.4–6.2)
SODIUM SERPL-SCNC: 140 MMOL/L (ref 136–145)
WBC: 6.7 THOU/UL (ref 4–11)

## 2019-02-07 ASSESSMENT — MIFFLIN-ST. JEOR: SCORE: 1781.44

## 2019-02-08 LAB — 25(OH)D3 SERPL-MCNC: 36.1 NG/ML (ref 30–80)

## 2019-02-10 LAB — ZINC SERPL-MCNC: 102 UG/DL (ref 60–120)

## 2019-02-12 ENCOUNTER — COMMUNICATION - HEALTHEAST (OUTPATIENT)
Dept: SURGERY | Facility: CLINIC | Age: 64
End: 2019-02-12

## 2019-03-04 ENCOUNTER — RECORDS - HEALTHEAST (OUTPATIENT)
Dept: ADMINISTRATIVE | Facility: OTHER | Age: 64
End: 2019-03-04

## 2019-03-10 ENCOUNTER — COMMUNICATION - HEALTHEAST (OUTPATIENT)
Dept: FAMILY MEDICINE | Facility: CLINIC | Age: 64
End: 2019-03-10

## 2019-03-10 DIAGNOSIS — I10 ESSENTIAL HYPERTENSION, BENIGN: ICD-10-CM

## 2019-04-11 ENCOUNTER — COMMUNICATION - HEALTHEAST (OUTPATIENT)
Dept: FAMILY MEDICINE | Facility: CLINIC | Age: 64
End: 2019-04-11

## 2019-04-11 DIAGNOSIS — E78.00 PURE HYPERCHOLESTEROLEMIA: ICD-10-CM

## 2019-04-30 ENCOUNTER — RECORDS - HEALTHEAST (OUTPATIENT)
Dept: ADMINISTRATIVE | Facility: OTHER | Age: 64
End: 2019-04-30

## 2019-05-16 ENCOUNTER — COMMUNICATION - HEALTHEAST (OUTPATIENT)
Dept: SCHEDULING | Facility: CLINIC | Age: 64
End: 2019-05-16

## 2019-05-22 ENCOUNTER — COMMUNICATION - HEALTHEAST (OUTPATIENT)
Dept: FAMILY MEDICINE | Facility: CLINIC | Age: 64
End: 2019-05-22

## 2019-05-22 DIAGNOSIS — F33.41 RECURRENT MAJOR DEPRESSION IN PARTIAL REMISSION (H): ICD-10-CM

## 2019-06-05 ENCOUNTER — OFFICE VISIT - HEALTHEAST (OUTPATIENT)
Dept: FAMILY MEDICINE | Facility: CLINIC | Age: 64
End: 2019-06-05

## 2019-06-05 DIAGNOSIS — E11.9 TYPE 2 DIABETES MELLITUS WITHOUT COMPLICATION, WITHOUT LONG-TERM CURRENT USE OF INSULIN (H): ICD-10-CM

## 2019-06-05 DIAGNOSIS — I10 ESSENTIAL HYPERTENSION: ICD-10-CM

## 2019-06-05 DIAGNOSIS — E78.00 PURE HYPERCHOLESTEROLEMIA: ICD-10-CM

## 2019-06-05 DIAGNOSIS — N40.1 BENIGN PROSTATIC HYPERPLASIA WITH NOCTURIA: ICD-10-CM

## 2019-06-05 DIAGNOSIS — R35.1 BENIGN PROSTATIC HYPERPLASIA WITH NOCTURIA: ICD-10-CM

## 2019-06-05 DIAGNOSIS — R22.42 FOOT MASS, LEFT: ICD-10-CM

## 2019-06-05 DIAGNOSIS — Z23 NEED FOR VACCINATION: ICD-10-CM

## 2019-06-05 LAB
ALBUMIN SERPL-MCNC: 4.1 G/DL (ref 3.5–5)
ALP SERPL-CCNC: 83 U/L (ref 45–120)
ALT SERPL W P-5'-P-CCNC: 19 U/L (ref 0–45)
ANION GAP SERPL CALCULATED.3IONS-SCNC: 6 MMOL/L (ref 5–18)
AST SERPL W P-5'-P-CCNC: 15 U/L (ref 0–40)
BILIRUB DIRECT SERPL-MCNC: 0.3 MG/DL
BILIRUB SERPL-MCNC: 0.7 MG/DL (ref 0–1)
BUN SERPL-MCNC: 19 MG/DL (ref 8–22)
CALCIUM SERPL-MCNC: 10.1 MG/DL (ref 8.5–10.5)
CHLORIDE BLD-SCNC: 104 MMOL/L (ref 98–107)
CHOLEST SERPL-MCNC: 127 MG/DL
CO2 SERPL-SCNC: 30 MMOL/L (ref 22–31)
CREAT SERPL-MCNC: 0.84 MG/DL (ref 0.7–1.3)
CREAT UR-MCNC: 137.9 MG/DL
FASTING STATUS PATIENT QL REPORTED: YES
GFR SERPL CREATININE-BSD FRML MDRD: >60 ML/MIN/1.73M2
GLUCOSE BLD-MCNC: 89 MG/DL (ref 70–125)
HBA1C MFR BLD: 5.7 % (ref 3.5–6)
HDLC SERPL-MCNC: 52 MG/DL
LDLC SERPL CALC-MCNC: 59 MG/DL
MICROALBUMIN UR-MCNC: 1.25 MG/DL (ref 0–1.99)
MICROALBUMIN/CREAT UR: 9.1 MG/G
POTASSIUM BLD-SCNC: 4.1 MMOL/L (ref 3.5–5)
PROT SERPL-MCNC: 6.7 G/DL (ref 6–8)
SODIUM SERPL-SCNC: 140 MMOL/L (ref 136–145)
TRIGL SERPL-MCNC: 79 MG/DL

## 2019-06-05 ASSESSMENT — MIFFLIN-ST. JEOR: SCORE: 1767.83

## 2019-06-11 ENCOUNTER — COMMUNICATION - HEALTHEAST (OUTPATIENT)
Dept: OTHER | Facility: CLINIC | Age: 64
End: 2019-06-11

## 2019-06-11 ENCOUNTER — OFFICE VISIT - HEALTHEAST (OUTPATIENT)
Dept: PODIATRY | Facility: CLINIC | Age: 64
End: 2019-06-11

## 2019-06-11 DIAGNOSIS — M20.42 HAMMER TOE OF LEFT FOOT: ICD-10-CM

## 2019-06-11 DIAGNOSIS — L84 TYLOMA: ICD-10-CM

## 2019-06-11 DIAGNOSIS — M21.6X2 PLANTAR FLEXED METATARSAL BONE OF LEFT FOOT: ICD-10-CM

## 2019-06-11 ASSESSMENT — MIFFLIN-ST. JEOR: SCORE: 1767.83

## 2019-06-18 ENCOUNTER — AMBULATORY - HEALTHEAST (OUTPATIENT)
Dept: OTHER | Facility: CLINIC | Age: 64
End: 2019-06-18

## 2019-06-19 ENCOUNTER — OFFICE VISIT - HEALTHEAST (OUTPATIENT)
Dept: SLEEP MEDICINE | Facility: CLINIC | Age: 64
End: 2019-06-19

## 2019-06-19 ENCOUNTER — AMBULATORY - HEALTHEAST (OUTPATIENT)
Dept: SLEEP MEDICINE | Facility: CLINIC | Age: 64
End: 2019-06-19

## 2019-06-19 DIAGNOSIS — G47.8 SLEEP DYSFUNCTION WITH SLEEP STAGE DISTURBANCE: ICD-10-CM

## 2019-06-19 DIAGNOSIS — G47.33 OSA ON CPAP: ICD-10-CM

## 2019-06-19 ASSESSMENT — MIFFLIN-ST. JEOR: SCORE: 1799.58

## 2019-07-02 ENCOUNTER — AMBULATORY - HEALTHEAST (OUTPATIENT)
Dept: OTHER | Facility: CLINIC | Age: 64
End: 2019-07-02

## 2019-07-08 ENCOUNTER — COMMUNICATION - HEALTHEAST (OUTPATIENT)
Dept: FAMILY MEDICINE | Facility: CLINIC | Age: 64
End: 2019-07-08

## 2019-07-08 ENCOUNTER — RECORDS - HEALTHEAST (OUTPATIENT)
Dept: ADMINISTRATIVE | Facility: OTHER | Age: 64
End: 2019-07-08

## 2019-07-15 ENCOUNTER — OFFICE VISIT - HEALTHEAST (OUTPATIENT)
Dept: FAMILY MEDICINE | Facility: CLINIC | Age: 64
End: 2019-07-15

## 2019-07-15 DIAGNOSIS — S40.022S HEMATOMA OF ARM, LEFT, SEQUELA: ICD-10-CM

## 2019-07-15 DIAGNOSIS — S80.12XS LEG HEMATOMA, LEFT, SEQUELA: ICD-10-CM

## 2019-07-15 DIAGNOSIS — S70.02XD HEMATOMA OF LEFT HIP, SUBSEQUENT ENCOUNTER: ICD-10-CM

## 2019-07-15 DIAGNOSIS — Z48.02 VISIT FOR SUTURE REMOVAL: ICD-10-CM

## 2019-07-23 ENCOUNTER — RECORDS - HEALTHEAST (OUTPATIENT)
Dept: HEALTH INFORMATION MANAGEMENT | Facility: CLINIC | Age: 64
End: 2019-07-23

## 2019-07-29 ENCOUNTER — OFFICE VISIT - HEALTHEAST (OUTPATIENT)
Dept: FAMILY MEDICINE | Facility: CLINIC | Age: 64
End: 2019-07-29

## 2019-07-29 DIAGNOSIS — L03.116 LEFT LEG CELLULITIS: ICD-10-CM

## 2019-09-30 ENCOUNTER — OFFICE VISIT - HEALTHEAST (OUTPATIENT)
Dept: FAMILY MEDICINE | Facility: CLINIC | Age: 64
End: 2019-09-30

## 2019-09-30 DIAGNOSIS — I25.10 CORONARY ARTERY DISEASE INVOLVING NATIVE HEART WITHOUT ANGINA PECTORIS, UNSPECIFIED VESSEL OR LESION TYPE: ICD-10-CM

## 2019-09-30 DIAGNOSIS — Z23 NEED FOR VACCINATION: ICD-10-CM

## 2019-09-30 DIAGNOSIS — E11.9 TYPE 2 DIABETES MELLITUS WITHOUT COMPLICATION, WITHOUT LONG-TERM CURRENT USE OF INSULIN (H): ICD-10-CM

## 2019-09-30 DIAGNOSIS — E78.00 PURE HYPERCHOLESTEROLEMIA: ICD-10-CM

## 2019-09-30 DIAGNOSIS — I10 ESSENTIAL HYPERTENSION: ICD-10-CM

## 2019-09-30 LAB
ALBUMIN SERPL-MCNC: 4 G/DL (ref 3.5–5)
ALP SERPL-CCNC: 91 U/L (ref 45–120)
ALT SERPL W P-5'-P-CCNC: 14 U/L (ref 0–45)
ANION GAP SERPL CALCULATED.3IONS-SCNC: 7 MMOL/L (ref 5–18)
AST SERPL W P-5'-P-CCNC: 13 U/L (ref 0–40)
BILIRUB DIRECT SERPL-MCNC: 0.3 MG/DL
BILIRUB SERPL-MCNC: 0.8 MG/DL (ref 0–1)
BUN SERPL-MCNC: 14 MG/DL (ref 8–22)
CALCIUM SERPL-MCNC: 9.8 MG/DL (ref 8.5–10.5)
CHLORIDE BLD-SCNC: 106 MMOL/L (ref 98–107)
CHOLEST SERPL-MCNC: 118 MG/DL
CO2 SERPL-SCNC: 29 MMOL/L (ref 22–31)
CREAT SERPL-MCNC: 0.84 MG/DL (ref 0.7–1.3)
FASTING STATUS PATIENT QL REPORTED: YES
GFR SERPL CREATININE-BSD FRML MDRD: >60 ML/MIN/1.73M2
GLUCOSE BLD-MCNC: 97 MG/DL (ref 70–125)
HBA1C MFR BLD: 5.7 % (ref 3.5–6)
HDLC SERPL-MCNC: 58 MG/DL
LDLC SERPL CALC-MCNC: 49 MG/DL
POTASSIUM BLD-SCNC: 3.8 MMOL/L (ref 3.5–5)
PROT SERPL-MCNC: 6.5 G/DL (ref 6–8)
SODIUM SERPL-SCNC: 142 MMOL/L (ref 136–145)
TRIGL SERPL-MCNC: 54 MG/DL

## 2019-09-30 ASSESSMENT — MIFFLIN-ST. JEOR: SCORE: 1768.96

## 2019-10-22 ENCOUNTER — AMBULATORY - HEALTHEAST (OUTPATIENT)
Dept: NURSING | Facility: CLINIC | Age: 64
End: 2019-10-22

## 2019-10-22 DIAGNOSIS — Z01.30 BLOOD PRESSURE CHECK: ICD-10-CM

## 2019-10-29 ENCOUNTER — COMMUNICATION - HEALTHEAST (OUTPATIENT)
Dept: FAMILY MEDICINE | Facility: CLINIC | Age: 64
End: 2019-10-29

## 2019-10-29 DIAGNOSIS — I10 ESSENTIAL HYPERTENSION: ICD-10-CM

## 2019-10-31 ENCOUNTER — COMMUNICATION - HEALTHEAST (OUTPATIENT)
Dept: FAMILY MEDICINE | Facility: CLINIC | Age: 64
End: 2019-10-31

## 2019-10-31 DIAGNOSIS — R35.1 BENIGN PROSTATIC HYPERPLASIA WITH NOCTURIA: ICD-10-CM

## 2019-10-31 DIAGNOSIS — N40.1 BENIGN PROSTATIC HYPERPLASIA WITH NOCTURIA: ICD-10-CM

## 2019-11-01 RX ORDER — TAMSULOSIN HYDROCHLORIDE 0.4 MG/1
CAPSULE ORAL
Qty: 90 CAPSULE | Refills: 3 | Status: SHIPPED | OUTPATIENT
Start: 2019-11-01

## 2019-11-17 ENCOUNTER — COMMUNICATION - HEALTHEAST (OUTPATIENT)
Dept: FAMILY MEDICINE | Facility: CLINIC | Age: 64
End: 2019-11-17

## 2019-11-17 DIAGNOSIS — G47.00 INSOMNIA: ICD-10-CM

## 2019-12-04 ENCOUNTER — COMMUNICATION - HEALTHEAST (OUTPATIENT)
Dept: FAMILY MEDICINE | Facility: CLINIC | Age: 64
End: 2019-12-04

## 2019-12-04 DIAGNOSIS — E78.00 PURE HYPERCHOLESTEROLEMIA: ICD-10-CM

## 2019-12-05 RX ORDER — ATORVASTATIN CALCIUM 80 MG/1
TABLET, FILM COATED ORAL
Qty: 90 TABLET | Refills: 3 | Status: SHIPPED | OUTPATIENT
Start: 2019-12-05

## 2020-01-12 ENCOUNTER — COMMUNICATION - HEALTHEAST (OUTPATIENT)
Dept: FAMILY MEDICINE | Facility: CLINIC | Age: 65
End: 2020-01-12

## 2020-01-12 DIAGNOSIS — I10 ESSENTIAL HYPERTENSION: ICD-10-CM

## 2020-01-12 DIAGNOSIS — F33.41 RECURRENT MAJOR DEPRESSION IN PARTIAL REMISSION (H): ICD-10-CM

## 2020-01-12 DIAGNOSIS — I10 ESSENTIAL HYPERTENSION, BENIGN: ICD-10-CM

## 2020-01-14 RX ORDER — SPIRONOLACTONE 25 MG/1
TABLET ORAL
Qty: 90 TABLET | Refills: 2 | Status: SHIPPED | OUTPATIENT
Start: 2020-01-14

## 2020-01-14 RX ORDER — SERTRALINE HYDROCHLORIDE 100 MG/1
TABLET, FILM COATED ORAL
Qty: 225 TABLET | Refills: 2 | Status: SHIPPED | OUTPATIENT
Start: 2020-01-14

## 2020-01-14 RX ORDER — DILTIAZEM HYDROCHLORIDE 180 MG/1
CAPSULE, COATED, EXTENDED RELEASE ORAL
Qty: 180 CAPSULE | Refills: 2 | Status: SHIPPED | OUTPATIENT
Start: 2020-01-14

## 2020-01-20 ENCOUNTER — OFFICE VISIT - HEALTHEAST (OUTPATIENT)
Dept: FAMILY MEDICINE | Facility: CLINIC | Age: 65
End: 2020-01-20

## 2020-01-20 DIAGNOSIS — E78.00 PURE HYPERCHOLESTEROLEMIA: ICD-10-CM

## 2020-01-20 DIAGNOSIS — I25.10 CORONARY ARTERY DISEASE INVOLVING NATIVE HEART WITHOUT ANGINA PECTORIS, UNSPECIFIED VESSEL OR LESION TYPE: ICD-10-CM

## 2020-01-20 DIAGNOSIS — E66.3 OVERWEIGHT: ICD-10-CM

## 2020-01-20 DIAGNOSIS — I10 ESSENTIAL HYPERTENSION: ICD-10-CM

## 2020-01-20 DIAGNOSIS — E11.9 TYPE 2 DIABETES MELLITUS WITHOUT COMPLICATION, WITHOUT LONG-TERM CURRENT USE OF INSULIN (H): ICD-10-CM

## 2020-01-20 DIAGNOSIS — I83.93 VARICOSE VEINS OF BOTH LOWER EXTREMITIES, UNSPECIFIED WHETHER COMPLICATED: ICD-10-CM

## 2020-01-20 DIAGNOSIS — I87.2 STASIS DERMATITIS OF BOTH LEGS: ICD-10-CM

## 2020-01-20 LAB
ALBUMIN SERPL-MCNC: 3.9 G/DL (ref 3.5–5)
ALP SERPL-CCNC: 86 U/L (ref 45–120)
ALT SERPL W P-5'-P-CCNC: 20 U/L (ref 0–45)
ANION GAP SERPL CALCULATED.3IONS-SCNC: 8 MMOL/L (ref 5–18)
AST SERPL W P-5'-P-CCNC: 19 U/L (ref 0–40)
BILIRUB DIRECT SERPL-MCNC: 0.3 MG/DL
BILIRUB SERPL-MCNC: 0.9 MG/DL (ref 0–1)
BUN SERPL-MCNC: 18 MG/DL (ref 8–22)
CALCIUM SERPL-MCNC: 9.3 MG/DL (ref 8.5–10.5)
CHLORIDE BLD-SCNC: 106 MMOL/L (ref 98–107)
CHOLEST SERPL-MCNC: 114 MG/DL
CO2 SERPL-SCNC: 28 MMOL/L (ref 22–31)
CREAT SERPL-MCNC: 0.88 MG/DL (ref 0.7–1.3)
FASTING STATUS PATIENT QL REPORTED: YES
GFR SERPL CREATININE-BSD FRML MDRD: >60 ML/MIN/1.73M2
GLUCOSE BLD-MCNC: 89 MG/DL (ref 70–125)
HBA1C MFR BLD: 5.9 % (ref 3.5–6)
HDLC SERPL-MCNC: 57 MG/DL
LDLC SERPL CALC-MCNC: 50 MG/DL
POTASSIUM BLD-SCNC: 3.9 MMOL/L (ref 3.5–5)
PROT SERPL-MCNC: 6.4 G/DL (ref 6–8)
SODIUM SERPL-SCNC: 142 MMOL/L (ref 136–145)
TRIGL SERPL-MCNC: 33 MG/DL

## 2020-01-20 ASSESSMENT — MIFFLIN-ST. JEOR: SCORE: 1772.36

## 2020-01-20 ASSESSMENT — PATIENT HEALTH QUESTIONNAIRE - PHQ9: SUM OF ALL RESPONSES TO PHQ QUESTIONS 1-9: 0

## 2020-03-02 ENCOUNTER — RECORDS - HEALTHEAST (OUTPATIENT)
Dept: ADMINISTRATIVE | Facility: OTHER | Age: 65
End: 2020-03-02

## 2020-05-18 ENCOUNTER — OFFICE VISIT - HEALTHEAST (OUTPATIENT)
Dept: FAMILY MEDICINE | Facility: CLINIC | Age: 65
End: 2020-05-18

## 2020-05-18 DIAGNOSIS — I10 ESSENTIAL HYPERTENSION: ICD-10-CM

## 2020-05-18 DIAGNOSIS — I25.10 CORONARY ARTERY DISEASE INVOLVING NATIVE HEART WITHOUT ANGINA PECTORIS, UNSPECIFIED VESSEL OR LESION TYPE: ICD-10-CM

## 2020-05-18 DIAGNOSIS — G47.33 OSA ON CPAP: ICD-10-CM

## 2020-05-18 DIAGNOSIS — E78.00 PURE HYPERCHOLESTEROLEMIA: ICD-10-CM

## 2020-05-18 DIAGNOSIS — E11.9 TYPE 2 DIABETES MELLITUS WITHOUT COMPLICATION, WITHOUT LONG-TERM CURRENT USE OF INSULIN (H): ICD-10-CM

## 2020-05-18 RX ORDER — ZINC GLUCONATE 50 MG
50 TABLET ORAL DAILY
Status: SHIPPED | COMMUNITY
Start: 2020-05-18

## 2020-06-25 ENCOUNTER — COMMUNICATION - HEALTHEAST (OUTPATIENT)
Dept: FAMILY MEDICINE | Facility: CLINIC | Age: 65
End: 2020-06-25

## 2020-06-25 DIAGNOSIS — G47.00 INSOMNIA: ICD-10-CM

## 2020-06-26 RX ORDER — TRAZODONE HYDROCHLORIDE 50 MG/1
TABLET, FILM COATED ORAL
Qty: 180 TABLET | Refills: 2 | Status: SHIPPED | OUTPATIENT
Start: 2020-06-26

## 2020-07-02 ENCOUNTER — RECORDS - HEALTHEAST (OUTPATIENT)
Dept: ADMINISTRATIVE | Facility: OTHER | Age: 65
End: 2020-07-02

## 2020-11-29 ENCOUNTER — RECORDS - HEALTHEAST (OUTPATIENT)
Dept: ADMINISTRATIVE | Facility: OTHER | Age: 65
End: 2020-11-29

## 2021-03-29 ENCOUNTER — AMBULATORY - RIVER FALLS (OUTPATIENT)
Dept: FAMILY MEDICINE | Facility: CLINIC | Age: 66
End: 2021-03-29

## 2021-05-26 VITALS — DIASTOLIC BLOOD PRESSURE: 76 MMHG | SYSTOLIC BLOOD PRESSURE: 118 MMHG

## 2021-05-26 ASSESSMENT — PATIENT HEALTH QUESTIONNAIRE - PHQ9: SUM OF ALL RESPONSES TO PHQ QUESTIONS 1-9: 0

## 2021-05-27 ENCOUNTER — RECORDS - HEALTHEAST (OUTPATIENT)
Dept: ADMINISTRATIVE | Facility: CLINIC | Age: 66
End: 2021-05-27

## 2021-05-27 NOTE — TELEPHONE ENCOUNTER
Refill Approved    Rx renewed per Medication Renewal Policy. Medication was last renewed on 7/12/18.    Julianna Figueroa, [facility] Triage/Med Refill 4/12/2019     Requested Prescriptions   Pending Prescriptions Disp Refills     atorvastatin (LIPITOR) 80 MG tablet [Pharmacy Med Name: ATORVASTATIN 80MG TAB  TABLET] 90 tablet 2     Sig: TAKE ONE TABLET BY MOUTH AT BEDTIME       Statins Refill Protocol (Hmg CoA Reductase Inhibitors) Passed - 4/11/2019  8:10 AM        Passed - PCP or prescribing provider visit in past 12 months      Last office visit with prescriber/PCP: 2/4/2019 Malcolm Gold MD OR same dept: 2/4/2019 Malcolm Gold MD OR same specialty: 2/4/2019 Malcolm Gold MD  Last physical: 7/17/2017 Last MTM visit: Visit date not found   Next visit within 3 mo: Visit date not found  Next physical within 3 mo: Visit date not found  Prescriber OR PCP: Malcolm Gold MD  Last diagnosis associated with med order: 1. Essential Hypercholesterolemia  - atorvastatin (LIPITOR) 80 MG tablet [Pharmacy Med Name: ATORVASTATIN 80MG TAB  TABLET]; TAKE ONE TABLET BY MOUTH AT BEDTIME  Dispense: 90 tablet; Refill: 2    If protocol passes may refill for 12 months if within 3 months of last provider visit (or a total of 15 months).

## 2021-05-28 NOTE — TELEPHONE ENCOUNTER
He is experiencing orthostatic hypotension. He just needs to make sure that when performing position changes such as sitting to standing that he is getting his bearings before taking that first step. He should continue the same medications as his two readings are considered normotensive still. If his systolic BP consistently stays in the 80 to 90 range and his symptoms become more severe, then he should be seen in clinic for evaluation and adjustment of his medications. Make sure he is drinking enough water, should be drinking minimum of 64 ounces daily. Less than this can cause dizziness/dehydration symptoms as well.

## 2021-05-28 NOTE — TELEPHONE ENCOUNTER
Call from pt     CC: Dizziness / lightheadedness when standing up from bending over over the past several days - also lower BP readings              > Spells last 5-10 seconds at a time - occur after bending over and moving to the standing position       > Has noted low BP readings   Today at home:   105/62mmHg    Tuesday    116/64mmHg  and 109/66mmHg      No other sx - no chest pain no shortness of breath no sweating       He has appt for next month with provider but will message to see if any changes in BP meds before the appt       Pt tele# [phone]  Okay to leave a detailed message on [employer].     A/P:   > Will message provider       Wes Stewart, RN   Triage and Medication Refills      Reason for Disposition    Fall in systolic BP > 20 mmHg after standing up    Protocols used: LOW BLOOD PRESSURE-A-OH

## 2021-05-29 NOTE — PROGRESS NOTES
Assessment:  1.  Non-insulin-dependent diabetes mellitus, checking status.  2.  Hypertension, appears overcontrolled probably.  3.  Hyperlipidemia checking status.  4.  Benign prostatic hypertrophy.  5.  Need for immunization.  6.  Left foot mass, is probably callus surrounding a small wart, but is not typical in terms of size and appearance.      Plan: Given Pneumovax.  Check fasting A1c, lipid hepatic and basic profiles.  Recommend he discontinue spironolactone.  Refill tamsulosin 0.4 mg-#90-refill x1-1 p.o. every afternoon.  Continue his Cartia and olmesartan medication.  Follow-up in 3 months but earlier as needed.  He understands and agrees.    Subjective: 63-year-old male presenting for follow-up on multiple issues as above.  Regarding diabetes he does not check blood sugars with his weight loss and how he is done.  Regarding hypertension he notes that he is having times where he feels dizzy when he stands up and it happens about 50% of the time.  Can also happen from laying down to sitting up.  Regarding lipids no diarrhea constipation muscle aching or muscle weakness.  He is fasting.  He does need refill on tamsulosin for his urination.  He notes he has a lump in the left foot and he has been shaving it at least every 2 weeks.  That temporarily helps but then he still gets the pain recurring when the lump enlarges.  He feels it is a corn.  But wonders what he can do.  Patient Active Problem List   Diagnosis     ELINOR on CPAP     Onychomycosis     Rosacea     Allergic Rhinitis     Recurrent major depression in partial remission (H)     Essential Hypercholesterolemia     Essential hypertension     Vitamin D Deficiency     Calcaneal spur of right foot     Obesity     Degenerative joint disease (DJD) of lumbar spine     Chronic back pain     Lower leg edema     Coronary artery disease     Hard of hearing     Hyperparathyroidism (H)     Low zinc level     Diabetes mellitus (H)     Paroxysmal atrial fibrillation (H)      Benign prostatic hyperplasia with nocturia     Past Medical History:   Diagnosis Date     Allergic rhinitis      Atrial fibrillation (H)      Chronic back pain      Coronary artery disease     1999-no stents, no significant damage. Echo 7/2016, EKG 1/2017     Degenerative joint disease (DJD) of lumbar spine     traumatic fall L1-L3     Depression      Hard of hearing      Hypercholesterolemia      Hyperparathyroidism (H)      Hypertension      Low zinc level      Lower leg edema      Metabolic syndrome      Rosacea      Sleep apnea     CPAP     Type 2 diabetes mellitus (H)     A1c 6.9 3/2017     Allergies   Allergen Reactions     Amlodipine Other (See Comments)     ankle swelling       Benazepril Cough     Cat Dander      Clonidine Other (See Comments) and Dizziness     mood changes    mood changes       Mold Itching     nasal     Penicillins Other (See Comments)     Unknown childhood reaction     Current Outpatient Medications   Medication Sig Dispense Refill     apixaban (ELIQUIS) 5 mg Tab tablet Take 5 mg by mouth 2 (two) times a day.       aspirin 81 MG EC tablet Take 81 mg by mouth.       atorvastatin (LIPITOR) 80 MG tablet TAKE ONE TABLET BY MOUTH AT BEDTIME 90 tablet 2     calcium citrate-vitamin D3 250 mg calcium- 200 unit Tab Take 1 tablet by mouth 2 (two) times a day.       CARTIA  mg 24 hr capsule TAKE ONE CAPSULE BY MOUTH TWICE A  capsule 3     cholecalciferol, vitamin D3, 5,000 unit Tab Take 5,000 Units by mouth bedtime. Take as directed.       cyanocobalamin, vitamin B-12, 1,000 mcg Subl Place 1,000 mcg under the tongue daily.       multivitamin with iron (ONE DAILY WITH IRON) Tab tablet Take 1 tablet by mouth 2 (two) times a day.       olmesartan (BENICAR) 20 MG tablet Take 20 mg by mouth daily.       sertraline (ZOLOFT) 100 MG tablet TAKE 2.5 TABLETS (250MG) BY MOUTH EACH EVENING 270 tablet 2     traZODone (DESYREL) 50 MG tablet Take 2 tablets (100 mg total) by mouth at bedtime. 180  "tablet 3     zinc gluconate 50 mg tablet Take 1 tablet (50 mg total) by mouth daily. 90 tablet 1     tamsulosin (FLOMAX) 0.4 mg cap Take 1 capsule (0.4 mg total) by mouth daily after supper. 90 capsule 1     No current facility-administered medications for this visit.      Is also been taking the spironolactone 25 mg daily in addition to the above list of medicines.  All other review of systems are negative.    Objective:/72   Pulse 67   Ht 5' 11\" (1.803 m)   Wt 214 lb (97.1 kg)   SpO2 96%   BMI 29.85 kg/m    HEENT examination shows no acute change.  Neck supple.  Lungs clear.  Heart regular rate and rhythm without murmur.  Abdomen shows no masses tenderness or paraspinal megaly.  No pedal edema.  Examination of the left foot shows that he has a mass that is about 1.5 cm diameter that is discoid that is probably a corn but at the center there appears to be material that may be wart but the overall size of the mass is larger than you would expect for that size if it is indeed wart.  "

## 2021-05-29 NOTE — PROGRESS NOTES
S: Patient was seen today at the [facility] in [place] with a prescription from Dr. Skinner for bilateral custom FOs. According to the patient, he is experiencing constant pain and discomfort at the LT 2nd MTPJ where a hammer toe deformity is located along with a callus formation. Pain has been ongoing for 3-4 years. Patient states that 4 years ago a received a pair of Foot Levelers from his chiropractor for unrelated issues. Surgical history includes removal of bone spurs from the LT heel.     O: Patient is 5'11'', weighs 216 lbs, and current shoe size is a men's 10.5. Patient presents with hammer toe deformity at the LT 2nd toe, callus formation at the plantar aspect of the LT 2nd MTPJ, bilateral flexible forefoot, and 2 degrees of valgus hindfoot alignment (bilateral, reducible) with moderated medial longitudinal arch collapse while weight bearing.   Current FOs are bilateral Foot Levelers which are in poor condition and do not address patient's current issue.  Patient was alert/oriented during the appointment and not in distress. Patient ambulates with antalgic gait and without the use of ambulatory aids.    A: Impressions were taken with foam blocks as the patient was semi weight bearing. Valgus hindfoot alignment was reduced during the molding procedure. FOs will be fabricated by [facility] [employer] with top layer Spenco, mid layer Nickelplast (s), and base layer cork (MTPJs length). Metatarsal pads and relief for LT 2nd MTPJ will be incorporated into the new FOs. Custom FOs are required due to patient's foot structure not being accommodated by an OTS FO, the need to offload the LT 2nd MTPJ, and degree of correction needed to reduce valgus hindfoot alignment.     G: Custom FOs will treat patient's current condition by providing support to the transverse/medial longitudinal arch, offloading callus formation, and reducing valgus hindfoot alignment which will lead to joint stability, reduction in pain, and  increase patient's ADLs.    P: Patient will return to the [facility] for fitting.    This note was electronically signed by Jere MCLAIN , ABC #[id number], License #[id number]

## 2021-05-29 NOTE — PROGRESS NOTES
FOOT AND ANKLE SURGERY/PODIATRY CONSULT NOTE         ASSESSMENT:   To flex second metatarsal left foot  Hammertoe second toe left foot  Tyloma left foot      TREATMENT:  Debrided the painful hyperkeratotic lesion left foot.  I have recommended orthotics        HPI: Darius Escamilla return to the clinic today complaining of a painful callus on the bottom of the left foot just underneath the second toe area.  This is quite painful with weightbearing and ambulation.  He has not had any drainage of bleeding.  The pain is aggravated with weightbearing and ambulation.  It is an aching type pain which is relieved with nonweightbearing.  He denies any other previous treatment.  The patient stated he has been attempting to shave this callus at home to give him some relief.  The patient was seen in consultation at the request of Malcolm Gold MD for evaluation treatment painful left foot.     Past Medical History:   Diagnosis Date     Allergic rhinitis      Atrial fibrillation (H)      Chronic back pain      Coronary artery disease     1999-no stents, no significant damage. Echo 7/2016, EKG 1/2017     Degenerative joint disease (DJD) of lumbar spine     traumatic fall L1-L3     Depression      Hard of hearing      Hypercholesterolemia      Hyperparathyroidism (H)      Hypertension      Low zinc level      Lower leg edema      Metabolic syndrome      Rosacea      Sleep apnea     CPAP     Type 2 diabetes mellitus (H)     A1c 6.9 3/2017       Past Surgical History:   Procedure Laterality Date     ATRIAL ABLATION SURGERY  02/2018     COLONOSCOPY  2008     MS LAP, ADITHYA RESTRICT PROC, LONGITUDINAL GASTRECTOMY N/A 8/7/2017    Procedure: LAPAROSCOPIC SLEEVE GASTRECTOMY;  Surgeon: Ivan Watts MD;  Location: [facility];  Service: General     MS REMOVAL OF HEEL SPUR Right 3/10/2015    Procedure: RETROCALCANEAL BONE SPUR EXCISION, RIGHT FOOT;  Surgeon: Fantasma Skinner DPM;  Location: [facility] OR;  Service:  Podiatry     NY REMV CATARACT EXTRACAP,INSERT LENS      Description: Extracaps Cataract Extract With Prosthesis Insert Left Eye;  Proc Date: 04/16/2009;  Comments: Dr. David Schmitz-       Allergies   Allergen Reactions     Amlodipine Other (See Comments)     ankle swelling       Benazepril Cough     Cat Dander      Clonidine Other (See Comments) and Dizziness     mood changes    mood changes       Mold Itching     nasal     Penicillins Other (See Comments)     Unknown childhood reaction         Current Outpatient Medications:      apixaban (ELIQUIS) 5 mg Tab tablet, Take 5 mg by mouth 2 (two) times a day., Disp: , Rfl:      aspirin 81 MG EC tablet, Take 81 mg by mouth., Disp: , Rfl:      atorvastatin (LIPITOR) 80 MG tablet, TAKE ONE TABLET BY MOUTH AT BEDTIME, Disp: 90 tablet, Rfl: 2     calcium citrate-vitamin D3 250 mg calcium- 200 unit Tab, Take 1 tablet by mouth 2 (two) times a day., Disp: , Rfl:      CARTIA  mg 24 hr capsule, TAKE ONE CAPSULE BY MOUTH TWICE A DAY, Disp: 180 capsule, Rfl: 3     cholecalciferol, vitamin D3, 5,000 unit Tab, Take 5,000 Units by mouth bedtime. Take as directed., Disp: , Rfl:      cyanocobalamin, vitamin B-12, 1,000 mcg Subl, Place 1,000 mcg under the tongue daily., Disp: , Rfl:      multivitamin with iron (ONE DAILY WITH IRON) Tab tablet, Take 1 tablet by mouth 2 (two) times a day., Disp: , Rfl:      olmesartan (BENICAR) 20 MG tablet, Take 20 mg by mouth daily., Disp: , Rfl:      sertraline (ZOLOFT) 100 MG tablet, TAKE 2.5 TABLETS (250MG) BY MOUTH EACH EVENING, Disp: 270 tablet, Rfl: 2     tamsulosin (FLOMAX) 0.4 mg cap, Take 1 capsule (0.4 mg total) by mouth daily after supper., Disp: 90 capsule, Rfl: 1     traZODone (DESYREL) 50 MG tablet, Take 2 tablets (100 mg total) by mouth at bedtime., Disp: 180 tablet, Rfl: 3     zinc gluconate 50 mg tablet, Take 1 tablet (50 mg total) by mouth daily., Disp: 90 tablet, Rfl: 1    Family History   Problem Relation Age of Onset     Diabetes  Mother      Hypertension Mother      Heart disease Mother      Alcohol abuse Father      Hypertension Father      Diabetes Brother      Hypertension Brother      Sleep apnea Brother      Snoring Brother      Stroke Brother      Diabetes Maternal Aunt      Hypertension Maternal Aunt      Heart disease Maternal Aunt      Diabetes Maternal Uncle      Hypertension Maternal Uncle      Heart disease Maternal Uncle      Diabetes Maternal Grandmother      Hypertension Maternal Grandmother      Heart disease Maternal Grandmother      Diabetes Maternal Grandfather      Hypertension Maternal Grandfather      Heart disease Maternal Grandfather      Sleep apnea Brother      Snoring Brother      Sleep apnea Brother      Snoring Brother      Sleep apnea Brother      Snoring Brother      Snoring Brother      Snoring Brother      Hyperlipidemia Sister      Heart disease Sister      Hypertension Sister      Heart disease Brother      Hyperlipidemia Sister      Hypertension Sister        Social History     Socioeconomic History     Marital status:      Spouse name: Not on file     Number of children: Not on file     Years of education: Not on file     Highest education level: Not on file   Occupational History     Not on file   Social Needs     Financial resource strain: Not on file     Food insecurity:     Worry: Not on file     Inability: Not on file     Transportation needs:     Medical: Not on file     Non-medical: Not on file   Tobacco Use     Smoking status: Never Smoker     Smokeless tobacco: Never Used   Substance and Sexual Activity     Alcohol use: Yes     Drinks per session: 1 or 2     Binge frequency: Monthly     Comment: 1-5 per month or less     Drug use: No     Sexual activity: Yes     Partners: Female   Lifestyle     Physical activity:     Days per week: Not on file     Minutes per session: Not on file     Stress: Not on file   Relationships     Social connections:     Talks on phone: Not on file     Gets  together: Not on file     Attends [employer] service: Not on file     Active member of club or organization: Not on file     Attends meetings of clubs or organizations: Not on file     Relationship status: Not on file     Intimate partner violence:     Fear of current or ex partner: Not on file     Emotionally abused: Not on file     Physically abused: Not on file     Forced sexual activity: Not on file   Other Topics Concern     Not on file   Social History Narrative    , 2 children one in [place] one [place]    2 grandsons who play soccer    Works for [employer] for over 40 yrs. Plans to retire in 2018       Review of Systems - Patient denies fever, chills, rash, wound, stiffness, limping, numbness, weakness, heart burn, blood in stool, chest pain with activity, calf pain when walking, shortness of breath with activity, chronic cough, easy bleeding/bruising, swelling of ankles, excessive thirst, fatigue, depression, anxiety.  Patient admits to painful callus left foot.      OBJECTIVE:  Appearance: alert, well appearing, and in no distress.    Vitals:    06/11/19 1517   BP: 120/80   Pulse: 68   Temp: 99.4  F (37.4  C)       BMI= Body mass index is 29.85 kg/m .    General appearance: Patient is alert and fully cooperative with history & exam.  No sign of distress is noted during the visit.  Psychiatric: Affect is pleasant & appropriate.  Patient appears motivated to improve health.  Respiratory: Breathing is regular & unlabored while sitting.  HEENT: Hearing is intact to spoken word.  Speech is clear.  No gross evidence of visual impairment that would impact ambulation.    Vascular: Dorsalis pedis and posterior tibial pulses are palpable. There is no pedal hair growth bilaterally.  CFT < 3 sec from anterior tibial surface to distal digits bilaterally. There is no appreciable edema noted.  Dermatologic: There is a round raised hyperkeratotic nucleated lesion subsecond metatarsal head left foot.  There is pain on  direct palpation of this lesion.  There is no bleeding noted.  Turgor and texture are within normal limits. No coloration or temperature changes. No other primary or secondary lesions noted.  Neurologic: All epicritic and proprioceptive sensations are grossly intact bilaterally.  Musculoskeletal: All active and passive ankle, subtalar, midtarsal, and 1st MPJ range of motion are grossly intact without pain or crepitus, with the exception of none.  The second toe is held in flexion at the level of the proximal interphalangeal joint.  There is pain on palpation subsecond metatarsal head left foot.  There is a firm palpable subcutaneous mass sub-second metatarsal head left foot.  Manual muscle strength is within normal limits bilaterally. All dorsiflexors, plantarflexors, invertors, evertors are intact bilaterally. Tenderness present to sub-second metatarsal head left foot on palpation.  No tenderness to second metatarsal phalangeal joint left foot with range of motion. Calf is soft/non-tender without warmth/induration    Imaging:     No results found.       TREATMENT:  I debrided the hyperkeratotic lesion today.  I have recommended orthotics with a metatarsal raise.  The patient is to return to the clinic as needed.    Fantasma Skinner; ALINA  [facility] Foot & Ankle Surgery/Podiatry

## 2021-05-29 NOTE — TELEPHONE ENCOUNTER
Refill Approved    Rx renewed per Medication Renewal Policy. Medication was last renewed on 8/16/18.    Ary San, [facility] Triage/Med Refill 5/23/2019     Requested Prescriptions   Pending Prescriptions Disp Refills     sertraline (ZOLOFT) 100 MG tablet [Pharmacy Med Name: SERTRALINE 100MG TAB  TABLET] 225 tablet 2     Sig: TAKE 2.5 TABLETS (250MG) BY MOUTH EACH EVENING       [facility] Refill Protocol  Passed - 5/22/2019  9:02 AM        Passed - PCP or prescribing provider visit in last year     Last office visit with prescriber/PCP: 2/4/2019 Malcolm Gold MD OR same dept: 2/4/2019 Malcolm Gold MD OR same specialty: 2/4/2019 Malcolm Gold MD  Last physical: 7/17/2017 Last MTM visit: Visit date not found   Next visit within 3 mo: Visit date not found  Next physical within 3 mo: Visit date not found  Prescriber OR PCP: Malcoml Gold MD  Last diagnosis associated with med order: 1. Recurrent major depression in partial remission (H)  - sertraline (ZOLOFT) 100 MG tablet [Pharmacy Med Name: SERTRALINE 100MG TAB  TABLET]; TAKE 2.5 TABLETS (250MG) BY MOUTH EACH EVENING  Dispense: 225 tablet; Refill: 2    If protocol passes may refill for 12 months if within 3 months of last provider visit (or a total of 15 months).

## 2021-05-29 NOTE — PROGRESS NOTES
Dear Dr. Gold, Malcolm HERMOSILLO Md  [address] [facility]  [facility]  [address]  [place] [address]    Thank you for the opportunity to participate in the care of Mr. Darius Escamilla.    He is a 63 y.o. male who comes to the clinic for the transfer of care of his obstructive sleep apnea.  The patient was actually diagnosed with severe obstructive sleep apnea on 03/24/2004 with an apnea hypopnea index of 31.1 events per hour.  The patient has been doing well on CPAP therapy and is currently using [employer] as his durable medical equipment company.  He just needs a prescription to get supplies from his durable medical equipment company.  The patient otherwise has no complaints.  His review of systems is negative.     Ideal Sleep-Wake Cycle(devoid of societal pressure):    Patient would try to initiate sleep at around 11pm with a sleep latency of 30 sec. The patient would have 1-2 awakenings. Final wake up time is around 8:30AM.    Compliance Download data for 30 days:  Pressure setting: CPAP 11 CWP  Residual AHI: 2.3 events per hour  Leak: Minimal  Compliance: 100%  Mask Tolerance: Good  Skin irritation: None    Past Medical History  Past Medical History:   Diagnosis Date     Allergic rhinitis      Atrial fibrillation (H)      Chronic back pain      Coronary artery disease     1999-no stents, no significant damage. Echo 7/2016, EKG 1/2017     Degenerative joint disease (DJD) of lumbar spine     traumatic fall L1-L3     Depression      Hard of hearing      Hypercholesterolemia      Hyperparathyroidism (H)      Hypertension      Low zinc level      Lower leg edema      Metabolic syndrome      Rosacea      Sleep apnea     CPAP     Type 2 diabetes mellitus (H)     A1c 6.9 3/2017        Past Surgical History  Past Surgical History:   Procedure Laterality Date     ATRIAL ABLATION SURGERY  02/2018     COLONOSCOPY  2008     [place] LAP, ADITHYA RESTRICT PROC, LONGITUDINAL GASTRECTOMY N/A 8/7/2017    Procedure: LAPAROSCOPIC SLEEVE  GASTRECTOMY;  Surgeon: Ivan Watts MD;  Location: [facility] OR;  Service: General     AL REMOVAL OF HEEL SPUR Right 3/10/2015    Procedure: RETROCALCANEAL BONE SPUR EXCISION, RIGHT FOOT;  Surgeon: Fantasma Skinner DPM;  Location: [facility] OR;  Service: Podiatry     AL REMV CATARACT EXTRACAP,INSERT LENS      Description: Extracaps Cataract Extract With Prosthesis Insert Left Eye;  Proc Date: 04/16/2009;  Comments: Dr. David Hooper  Current Outpatient Medications   Medication Sig Dispense Refill     apixaban (ELIQUIS) 5 mg Tab tablet Take 5 mg by mouth 2 (two) times a day.       aspirin 81 MG EC tablet Take 81 mg by mouth.       atorvastatin (LIPITOR) 80 MG tablet TAKE ONE TABLET BY MOUTH AT BEDTIME 90 tablet 2     calcium citrate-vitamin D3 250 mg calcium- 200 unit Tab Take 1 tablet by mouth 2 (two) times a day.       CARTIA  mg 24 hr capsule TAKE ONE CAPSULE BY MOUTH TWICE A  capsule 3     cholecalciferol, vitamin D3, 5,000 unit Tab Take 5,000 Units by mouth bedtime. Take as directed.       cyanocobalamin, vitamin B-12, 1,000 mcg Subl Place 1,000 mcg under the tongue daily.       multivitamin with iron (ONE DAILY WITH IRON) Tab tablet Take 1 tablet by mouth 2 (two) times a day.       olmesartan (BENICAR) 20 MG tablet Take 20 mg by mouth daily.       sertraline (ZOLOFT) 100 MG tablet TAKE 2.5 TABLETS (250MG) BY MOUTH EACH EVENING 270 tablet 2     tamsulosin (FLOMAX) 0.4 mg cap Take 1 capsule (0.4 mg total) by mouth daily after supper. 90 capsule 1     traZODone (DESYREL) 50 MG tablet Take 2 tablets (100 mg total) by mouth at bedtime. 180 tablet 3     zinc gluconate 50 mg tablet Take 1 tablet (50 mg total) by mouth daily. 90 tablet 1     No current facility-administered medications for this visit.         Allergies  Amlodipine; Benazepril; Cat dander; Clonidine; Mold; and Penicillins     Social History  Social History     Socioeconomic History     Marital status:       Spouse name: Not on file     Number of children: Not on file     Years of education: Not on file     Highest education level: Not on file   Occupational History     Not on file   Social Needs     Financial resource strain: Not on file     Food insecurity:     Worry: Not on file     Inability: Not on file     Transportation needs:     Medical: Not on file     Non-medical: Not on file   Tobacco Use     Smoking status: Never Smoker     Smokeless tobacco: Never Used   Substance and Sexual Activity     Alcohol use: Yes     Drinks per session: 1 or 2     Binge frequency: Monthly     Comment: 1-5 per month or less     Drug use: No     Sexual activity: Yes     Partners: Female   Lifestyle     Physical activity:     Days per week: Not on file     Minutes per session: Not on file     Stress: Not on file   Relationships     Social connections:     Talks on phone: Not on file     Gets together: Not on file     Attends Synagogue service: Not on file     Active member of club or organization: Not on file     Attends meetings of clubs or organizations: Not on file     Relationship status: Not on file     Intimate partner violence:     Fear of current or ex partner: Not on file     Emotionally abused: Not on file     Physically abused: Not on file     Forced sexual activity: Not on file   Other Topics Concern     Not on file   Social History Narrative    , 2 children one in [facility]    2 grandsons who play soccer    Works for [employer] for over 40 yrs. Plans to retire in 2018        Family History  Family History   Problem Relation Age of Onset     Diabetes Mother      Hypertension Mother      Heart disease Mother      Alcohol abuse Father      Hypertension Father      Diabetes Brother      Hypertension Brother      Sleep apnea Brother      Snoring Brother      Stroke Brother      Diabetes Maternal Aunt      Hypertension Maternal Aunt      Heart disease Maternal Aunt      Diabetes Maternal Uncle       Hypertension Maternal Uncle      Heart disease Maternal Uncle      Diabetes Maternal Grandmother      Hypertension Maternal Grandmother      Heart disease Maternal Grandmother      Diabetes Maternal Grandfather      Hypertension Maternal Grandfather      Heart disease Maternal Grandfather      Sleep apnea Brother      Snoring Brother      Sleep apnea Brother      Snoring Brother      Sleep apnea Brother      Snoring Brother      Snoring Brother      Snoring Brother      Hyperlipidemia Sister      Heart disease Sister      Hypertension Sister      Heart disease Brother      Hyperlipidemia Sister      Hypertension Sister         Review of Systems:  Constitutional: Negative except as noted in HPI.   Eyes: Negative except as noted in HPI.   ENT: Negative except as noted in HPI.   Cardiovascular: Negative except as noted in HPI.   Respiratory: Negative except as noted in HPI.   Gastrointestinal: Negative except as noted in HPI.   Genitourinary: Negative except as noted in HPI.   Musculoskeletal: Negative except as noted in HPI.   Integumentary: Negative except as noted in HPI.   Neurological: Negative except as noted in HPI.   Psychiatric: Negative except as noted in HPI.   Endocrine: Negative except as noted in HPI.   Hematologic/Lymphatic: Negative except as noted in HPI.      STOP BANG 5/13/2016   Do you snore loudly (louder than talking or loud enough to be heard through closed doors)? 0   Do you often feel tired, fatigued, or sleepy during daytime? 1   Has anyone observed you stop breathing in your sleep? 1   Do you have or are you being treated for high blood pressure? 1   BMI more than 35 kg/m2 1   Age over 50 years old? 1   Neck circumference greater than 16 inches? 1   Gender male? 1   Total Score 7     Epworths Sleepiness Scale 5/13/2016 8/15/2016 6/19/2019   Sitting and reading 1 1 2   Watching TV 1 2 1   Sitting, inactive in a public place (e.g. a theatre or a meeting) 1 1 0   As a passenger in a car for an  "hour without a break 0 1 0   Lying down to rest in the afternoon when circumstances permit 1 2 1   Sitting and talking to someone 0 1 0   Sitting quietly after a lunch without alcohol 1 0 0   In a car, while stopped for a few minutes in traffic 0 0 0   Total score 5 8 4     Rooming 5/13/2016   Usual bedtime 8:30-9:30 pm   Sleep Latency 10 min   Awakenings 1-2 x   Wake Up Time 2-4:30 am   Weekends 8 am   Energy Drinks 0   Coffee 2 cups per day   Cola 0   Difficulty falling asleep No   Difficulty staying asleep No   Excessive daytime tiredness Yes   Excessive daytime sleepiness Yes   Dozing off while driving No   Shift Worker No   Sleep Walking? No   Sleep Talking? Yes   Kicking or punching? No   Restless legs symptoms No       Physical Exam:  /64 (Patient Site: Right Arm, Patient Position: Sitting, Cuff Size: Adult Large)   Pulse 72   Ht 5' 11\" (1.803 m)   Wt 221 lb (100.2 kg)   SpO2 98%   BMI 30.82 kg/m    BMI:Body mass index is 30.82 kg/m .   GEN: NAD, obese  Head: Normocephalic.  EYES: PERRLA, EOMI  ENT: Oropharynx is clear, Barahona class 3+ airway. Uvula is intact  Nasal mucosa is moist without erythema  Neck : Thyroid is within normal limits.  CV: Regular rate and rhythm, S1 & S2 positive.  LUNGS: Bilateral breathsounds heard.   ABDOMEN: Positive bowel sounds in all quadrants, soft, no rebound or guarding  MUSCULOSKELETAL: Bilateral 2+ leg swelling  SKIN: warm, dry, no rashes  Neurological: Alert, oriented to time, place, and person.  Psych: normal mood, normal affect     Labs/Studies:     Lab Results   Component Value Date    WBC 6.7 02/07/2019    HGB 14.4 02/07/2019    HCT 42.2 02/07/2019    MCV 93 02/07/2019     02/07/2019         Chemistry        Component Value Date/Time     06/05/2019 1052    K 4.1 06/05/2019 1052     06/05/2019 1052    CO2 30 06/05/2019 1052    BUN 19 06/05/2019 1052    CREATININE 0.84 06/05/2019 1052    GLU 89 06/05/2019 1052        Component Value " Date/Time    CALCIUM 10.1 06/05/2019 1052    ALKPHOS 83 06/05/2019 1052    AST 15 06/05/2019 1052    ALT 19 06/05/2019 1052    BILITOT 0.7 06/05/2019 1052            Lab Results   Component Value Date    FERRITIN 48 02/07/2019     Lab Results   Component Value Date    TSH 1.60 03/10/2017         Assessment and Plan:  In summary Darius Escamilla is a 63 y.o. year old male here for transfer of care.  1.  Obstructive sleep apnea  I reviewed the patient's compliance download with him in detail.  I congratulated him on his excellent usage.  I will keep him on the same pressure settings for now and write him a prescription so that he can continue to get supplies from his durable medical equipment company.  2.  Other sleep disturbance    Patient verbalized understanding of these issues, agrees with the plan and all questions were answered today. Patient was given an opportuntity to voice any other symptoms or concerns not listed above. Patient did not have any other symptoms or concerns.       Giovani Duff DO  Board Certified in Internal Medicine and Sleep Medicine  [facility].    (Note created with Dragon voice recognition and unintended spelling errors and word substitutions may occur)

## 2021-05-29 NOTE — PROGRESS NOTES
Order for Durable Medical Equipment was processed and equipment ordered.     DME Company: [facility]    Date: 6/19/2019    Ordering Provider: Dr. Duff    Equipment Ordered: CPAP Supplies    Fax Number: [facility]: [phone]    Danyelle Zaman CMA 6/19/2019 9:51 AM

## 2021-05-29 NOTE — PATIENT INSTRUCTIONS - HE
Please call one of the [place] locations below to schedule an appointment. If you received a prescription please bring it with you to your appointment. Some locations are limited to what they carry.    Office Locations    [facility]  [address] [place] [address]  Home Medical Equipment, [address]   Phone: [phone]   Orthotics and Prosthetics, [address]   Phone: [phone]    [facility]  Home Medical Equipment  [address], [address], [place] [address]   Phone: [phone]    Orthotics and Prosthetics ([facility])    [address], [address], [place] [address]  Phone: [phone]    [facility]  [address]  [address]   [place] [address]   Phone: [phone]    [facility]  [address]  [place] [address]  Phone: [phone]    [facility]   [address]  [place] [address]  Phone: [phone]    [facility]  [address]  [place] [address]  Phone: [phone]    [facility]  [address]  [address]  [place] [address]  Phone: [phone]    [place]   [address] [place] [address]   Phone: [phone]

## 2021-05-30 VITALS — WEIGHT: 279.9 LBS | BODY MASS INDEX: 37.96 KG/M2

## 2021-05-30 VITALS — WEIGHT: 284 LBS | HEIGHT: 71 IN | BODY MASS INDEX: 39.76 KG/M2

## 2021-05-30 VITALS — BODY MASS INDEX: 38.06 KG/M2 | WEIGHT: 281 LBS | HEIGHT: 72 IN

## 2021-05-30 VITALS — WEIGHT: 268.4 LBS | HEIGHT: 71 IN | BODY MASS INDEX: 37.57 KG/M2

## 2021-05-30 VITALS — BODY MASS INDEX: 36.13 KG/M2 | HEIGHT: 71 IN | WEIGHT: 258.1 LBS

## 2021-05-30 VITALS — BODY MASS INDEX: 38.71 KG/M2 | WEIGHT: 279.5 LBS

## 2021-05-30 VITALS — WEIGHT: 262 LBS | BODY MASS INDEX: 36.29 KG/M2

## 2021-05-30 NOTE — PROGRESS NOTES
S: Patient was seen today at the [facility] in [place] for fitting of bilateral custom FOs with metatarsal pads and relief for LT 2nd MTPJ fabricated by [employer].     O: Patient's status has not changed since last visit.    A: Custom FOs were trimmed to accommodate contours and length of patient's current shoes. Relief was increased per patient feedback. Patient was able to ambulate with the FOs on during the appointment without issue.     Outcome Measure: FOs were found to be comfortable for the patient to wear (per patient feedback), valgus hindfoot alignment was reduce by the FOs while weightbearing, and there were no visible signs of excessive pressure.     Patient is satisfied with the fit and function of the FOs. Patient was given verbal instructions  on cleaning of the FOs, fitting issues, warranty issues, and who to contact if there is an issue.     P: Patient will go through a 5 day break in period which involves increasing the wear time in the FOs by 2 hours each day until on the 5th day the patient is wearing the FOs all day. Patient will follow up with the [facility] as needed.    This note was electronically signed by Jere MCLAIN , ABC #[id number], License #[id number]

## 2021-05-30 NOTE — PROGRESS NOTES
Assessment:  1.  Left ear suture removal.  2.  Left arm hematoma and contusion.  3.  Left hip hematoma and contusion.  4.  Left leg hematoma.    Plan: Okay to resume Eliquis at this time.  But be very careful to avoid rubbing the left ear, leave the scab alone, if the area gets wet only pat it dry, follow-up in 2 weeks for recheck on the left ear.  Explained that the hematoma on the left hip laterally will likely take months for that to totally resolve.  Explained that we will be slow.  Discussed the natural history of resolving hematoma.    Subjective: 63-year-old male who had injury when he fell on July 7, landed on his left side on hard concrete, sustaining laceration to his left ear as well as bleeding and bruising injury to his left arm left hip and left leg.  He had the left ear laceration sutured at the outside hospital but then was transferred to [facility] for observation and care of the closed head injury.  He is here today for follow-up and possible suture removal.  He had a number of sutures placed in the left ear outer lobe area.  He did not have a loss of consciousness apparently.  He notes a big lump on the outside of his left hip.  He denies any fever nausea vomiting or blood in the urine or blood in the stool.  He has been off his Eliquis since the injury.  He is taking a low-dose aspirin.  Patient Active Problem List   Diagnosis     ELINOR on CPAP     Onychomycosis     Rosacea     Allergic Rhinitis     Recurrent major depression in partial remission (H)     Essential Hypercholesterolemia     Essential hypertension     Vitamin D Deficiency     Calcaneal spur of right foot     Obesity     Degenerative joint disease (DJD) of lumbar spine     Chronic back pain     Lower leg edema     Coronary artery disease     Hard of hearing     Hyperparathyroidism (H)     Low zinc level     Diabetes mellitus (H)     Paroxysmal atrial fibrillation (H)     Benign prostatic hyperplasia with nocturia     Past Medical  History:   Diagnosis Date     Allergic rhinitis      Atrial fibrillation (H)      Chronic back pain      Coronary artery disease     1999-no stents, no significant damage. Echo 7/2016, EKG 1/2017     Degenerative joint disease (DJD) of lumbar spine     traumatic fall L1-L3     Depression      Hard of hearing      Hypercholesterolemia      Hyperparathyroidism (H)      Hypertension      Low zinc level      Lower leg edema      Metabolic syndrome      Rosacea      Sleep apnea     CPAP     Type 2 diabetes mellitus (H)     A1c 6.9 3/2017     Allergies   Allergen Reactions     Amlodipine Other (See Comments)     ankle swelling       Benazepril Cough     Cat Dander      Clonidine Other (See Comments) and Dizziness     mood changes    mood changes       Mold Itching     nasal     Penicillins Other (See Comments)     Unknown childhood reaction     Current Outpatient Medications   Medication Sig Dispense Refill     aspirin 81 MG EC tablet Take 81 mg by mouth.       atorvastatin (LIPITOR) 80 MG tablet TAKE ONE TABLET BY MOUTH AT BEDTIME 90 tablet 2     calcium citrate-vitamin D3 250 mg calcium- 200 unit Tab Take 1 tablet by mouth 2 (two) times a day.       CARTIA  mg 24 hr capsule TAKE ONE CAPSULE BY MOUTH TWICE A  capsule 3     cholecalciferol, vitamin D3, 5,000 unit Tab Take 5,000 Units by mouth bedtime. Take as directed.       cyanocobalamin, vitamin B-12, 1,000 mcg Subl Place 1,000 mcg under the tongue daily.       multivitamin with iron (ONE DAILY WITH IRON) Tab tablet Take 1 tablet by mouth 2 (two) times a day.       olmesartan (BENICAR) 20 MG tablet Take 20 mg by mouth daily.       sertraline (ZOLOFT) 100 MG tablet TAKE 2.5 TABLETS (250MG) BY MOUTH EACH EVENING 270 tablet 2     tamsulosin (FLOMAX) 0.4 mg cap Take 1 capsule (0.4 mg total) by mouth daily after supper. 90 capsule 1     traZODone (DESYREL) 50 MG tablet Take 2 tablets (100 mg total) by mouth at bedtime. 180 tablet 3     zinc gluconate 50 mg  tablet Take 1 tablet (50 mg total) by mouth daily. 90 tablet 1     apixaban (ELIQUIS) 5 mg Tab tablet Take 5 mg by mouth 2 (two) times a day.       No current facility-administered medications for this visit.      All other review of systems are negative for any new issues.    Objective:/70   Pulse 72   Temp 98.9  F (37.2  C) (Oral)   Wt 220 lb (99.8 kg)   SpO2 97%   BMI 30.68 kg/m    HEENT shows that there is a healing wound in the left ear where he had significant laceration that occurred that is vertical through at least half of the pinna, close to two thirds.  It appears that absorbable sutures were used to suture this.  It does appear to be healing well.  I removed a total of about a dozen flesh-colored sutures that were probably Dexon or similar type suture material.  The earlobe is intact.  No sign of purulence or any significant erythema, no sign of infection.  Very substantial ecchymosis of most of the left arm.  But he has good motion of the left elbow with no restriction of pronation supination flexion or extension.  Appears to have a large hematoma over the left trochanteric bursa area that appears to be perhaps 5 x 10 cm in size.  But he does ambulate independently and has not had difficulty doing that.  There is extensive ecchymosis over the left thigh and left calf area.  He has some large varicose veins in the lower legs.  But again no sign of infection with no acute erythema no purulence and areas of abrasion that are healing well.

## 2021-05-30 NOTE — TELEPHONE ENCOUNTER
New Appointment Needed  What is the reason for the visit:    The patient had 18 sutures in his ear and was told to follow up with PCP to take a look at it.    Provider Preference: PCP only  How soon do you need to be seen?: 7/15/19 - available all day  Waitlist offered?: Yes  Okay to leave a detailed message:  Yes

## 2021-05-30 NOTE — PROGRESS NOTES
Assessment:  1.  Left ear pain, but wound check shows appropriate healing.  2.  Left arm resolving ecchymosis.  3.  Left leg resolving ecchymosis.  4.  Left leg cellulitis.  5.  Stasis dermatitis in the left leg.    Plan: Prescribe cephalexin 500 mg-#40-1 p.o. 4 times daily for the cellulitis in the left leg.  Recommend to use his support stockings on a regular basis to reduce the swelling in the leg and also elevate the leg if he is not up walking around.  He will follow-up in September for his regular med check if he is healing well here but earlier as needed.  Explained that regular use of the support stockings helps to reduce swelling which reduces the risk of infection in the leg.  In his situation with his currently regular rhythm, with his history of the paroxysmal atrial fibrillation, but the history of the significant bleeding when he had a recent injury, it is reasonable for him to take the Eliquis 5 mg once a day instead of taking it twice a day.    Subjective: 63-year-old male presenting for follow-up after the injury.  See the note several weeks ago.  He notes he still has some soreness of the lower aspect of the pinna of the left ear.  He notes residual discoloration of the left arm though he acknowledges it is better.  He notes that he has had redness and soreness in the lower left leg above the ankle for the last week or so.  He does have history of varicose veins.  Patient Active Problem List   Diagnosis     ELINOR on CPAP     Onychomycosis     Rosacea     Allergic Rhinitis     Recurrent major depression in partial remission (H)     Essential Hypercholesterolemia     Essential hypertension     Vitamin D Deficiency     Calcaneal spur of right foot     Obesity     Degenerative joint disease (DJD) of lumbar spine     Chronic back pain     Lower leg edema     Coronary artery disease     Hard of hearing     Hyperparathyroidism (H)     Low zinc level     Diabetes mellitus (H)     Paroxysmal atrial  fibrillation (H)     Benign prostatic hyperplasia with nocturia     Past Medical History:   Diagnosis Date     Allergic rhinitis      Atrial fibrillation (H)      Chronic back pain      Coronary artery disease     1999-no stents, no significant damage. Echo 7/2016, EKG 1/2017     Degenerative joint disease (DJD) of lumbar spine     traumatic fall L1-L3     Depression      Hard of hearing      Hypercholesterolemia      Hyperparathyroidism (H)      Hypertension      Low zinc level      Lower leg edema      Metabolic syndrome      Rosacea      Sleep apnea     CPAP     Type 2 diabetes mellitus (H)     A1c 6.9 3/2017     Allergies   Allergen Reactions     Amlodipine Other (See Comments)     ankle swelling       Benazepril Cough     Cat Dander      Clonidine Other (See Comments) and Dizziness     mood changes    mood changes       Mold Itching     nasal     Penicillins Other (See Comments)     Unknown childhood reaction     Current Outpatient Medications   Medication Sig Dispense Refill     apixaban (ELIQUIS) 5 mg Tab tablet Take 5 mg by mouth 2 (two) times a day.       atorvastatin (LIPITOR) 80 MG tablet TAKE ONE TABLET BY MOUTH AT BEDTIME 90 tablet 2     calcium citrate-vitamin D3 250 mg calcium- 200 unit Tab Take 1 tablet by mouth 2 (two) times a day.       CARTIA  mg 24 hr capsule TAKE ONE CAPSULE BY MOUTH TWICE A  capsule 3     cholecalciferol, vitamin D3, 5,000 unit Tab Take 5,000 Units by mouth bedtime. Take as directed.       cyanocobalamin, vitamin B-12, 1,000 mcg Subl Place 1,000 mcg under the tongue daily.       multivitamin with iron (ONE DAILY WITH IRON) Tab tablet Take 1 tablet by mouth 2 (two) times a day.       olmesartan (BENICAR) 20 MG tablet Take 20 mg by mouth daily.       sertraline (ZOLOFT) 100 MG tablet TAKE 2.5 TABLETS (250MG) BY MOUTH EACH EVENING 270 tablet 2     tamsulosin (FLOMAX) 0.4 mg cap Take 1 capsule (0.4 mg total) by mouth daily after supper. 90 capsule 1     traZODone  (DESYREL) 50 MG tablet Take 2 tablets (100 mg total) by mouth at bedtime. 180 tablet 3     zinc gluconate 50 mg tablet Take 1 tablet (50 mg total) by mouth daily. 90 tablet 1     cephalexin (KEFLEX) 500 MG capsule Take 1 capsule (500 mg total) by mouth 4 (four) times a day for 10 days. 40 capsule 0     No current facility-administered medications for this visit.      Note the above list is after the addition of the cephalexin today.  He has a history of penicillin allergy but he got IV cephalosporins preoperative in 2015 before foot surgery etc. and has not had trouble with that.  He notes concern about easy bleeding stating that it took over 3 hours for them to control the bleeding when he had this injury and wonders if he should cut down on taking the Eliquis and only take it once a day instead of twice a day.    Objective:/74   Pulse 77   Wt 219 lb (99.3 kg)   SpO2 95%   BMI 30.54 kg/m    HEENT exam shows that left ear is healing well, question of very tiny superficial irritation in the lower pinna but no palpable abscess, no significant erythema, no drainage there.  Minimal residual ecchymosis in the left forearm.  Left leg shows an area of erythema and warmth and tenderness that is about 10 x 15 cm that is above the medial malleolus.  He does have varicose veins in both the right and left legs.  He does have some brownish pigmentation consistent with stasis dermatitis in the left leg.

## 2021-05-31 VITALS — WEIGHT: 216 LBS | HEIGHT: 71 IN | BODY MASS INDEX: 30.24 KG/M2

## 2021-05-31 VITALS — HEIGHT: 71 IN | BODY MASS INDEX: 35.14 KG/M2 | WEIGHT: 251 LBS

## 2021-05-31 VITALS — HEIGHT: 71 IN | WEIGHT: 261 LBS | BODY MASS INDEX: 36.54 KG/M2

## 2021-05-31 VITALS — BODY MASS INDEX: 33.32 KG/M2 | WEIGHT: 238 LBS | HEIGHT: 71 IN

## 2021-05-31 VITALS — BODY MASS INDEX: 35.25 KG/M2 | HEIGHT: 71 IN | WEIGHT: 251.8 LBS

## 2021-05-31 VITALS — BODY MASS INDEX: 36.54 KG/M2 | WEIGHT: 261 LBS | HEIGHT: 71 IN

## 2021-05-31 VITALS — BODY MASS INDEX: 29.29 KG/M2 | WEIGHT: 210 LBS

## 2021-05-31 VITALS — HEIGHT: 71 IN | WEIGHT: 265.6 LBS | BODY MASS INDEX: 37.18 KG/M2

## 2021-05-31 VITALS — HEIGHT: 71 IN | BODY MASS INDEX: 29.26 KG/M2 | WEIGHT: 209 LBS

## 2021-05-31 VITALS — BODY MASS INDEX: 35.08 KG/M2 | HEIGHT: 72 IN | WEIGHT: 259 LBS

## 2021-06-01 ENCOUNTER — RECORDS - HEALTHEAST (OUTPATIENT)
Dept: ADMINISTRATIVE | Facility: CLINIC | Age: 66
End: 2021-06-01

## 2021-06-01 VITALS — WEIGHT: 209 LBS | BODY MASS INDEX: 29.15 KG/M2

## 2021-06-01 VITALS — BODY MASS INDEX: 29.82 KG/M2 | WEIGHT: 213 LBS | HEIGHT: 71 IN

## 2021-06-01 VITALS — HEIGHT: 71 IN | BODY MASS INDEX: 29.4 KG/M2 | WEIGHT: 210 LBS

## 2021-06-01 VITALS — WEIGHT: 208 LBS | HEIGHT: 71 IN | BODY MASS INDEX: 29.12 KG/M2

## 2021-06-01 VITALS — BODY MASS INDEX: 30.1 KG/M2 | WEIGHT: 215 LBS | HEIGHT: 71 IN

## 2021-06-01 VITALS — WEIGHT: 211 LBS | BODY MASS INDEX: 29.43 KG/M2

## 2021-06-01 NOTE — PROGRESS NOTES
Assessment:  1.  Non-insulin-dependent diabetes mellitus, checking status.  2.  Hypertension with borderline control, may be related to is not sleeping well last night.  3.  Hyperlipidemia checking status.  4.  Coronary artery disease, asymptomatic.  #5.  Need for influenza immunization.    Plan: Check fasting A1c, lipid hepatic and basic profiles.  Continue current medication.  Follow-up in 4 months but earlier as needed.  Continue his excellent exercise and diet efforts.  He will get recheck blood pressure when he is back from his trip.    Subjective: 64-year-old male presenting for follow-up on the above.  Regarding diabetes he has not been checking blood sugars because he has had fine control since he had a significant weight loss from the bariatric surgery.  He continues to watch diet carefully etc.  Regarding hypertension no headaches dizziness or leg swelling.  Regarding lipids no diarrhea constipation muscle aching or muscle weakness.  Regarding the heart he has not had any chest pain or trouble breathing.  He notes cardiologist wanted him to stay on the Eliquis at twice a day.  See my note in July regarding his concern about the bleeding with his fall etc.  He has occasional soreness in the left ear where he had a laceration that has healed up.  Patient Active Problem List   Diagnosis     ELINOR on CPAP     Onychomycosis     Rosacea     Allergic Rhinitis     Recurrent major depression in partial remission (H)     Essential Hypercholesterolemia     Essential hypertension     Vitamin D Deficiency     Calcaneal spur of right foot     Obesity     Degenerative joint disease (DJD) of lumbar spine     Chronic back pain     Lower leg edema     Coronary artery disease     Hard of hearing     Low zinc level     Diabetes mellitus (H)     Paroxysmal atrial fibrillation (H)     Benign prostatic hyperplasia with nocturia     Past Medical History:   Diagnosis Date     Allergic rhinitis      Atrial fibrillation (H)       Chronic back pain      Coronary artery disease     1999-no stents, no significant damage. Echo 7/2016, EKG 1/2017     Degenerative joint disease (DJD) of lumbar spine     traumatic fall L1-L3     Depression      Hard of hearing      Hypercholesterolemia      Hyperparathyroidism (H)      Hypertension      Low zinc level      Lower leg edema      Metabolic syndrome      Rosacea      Sleep apnea     CPAP     Type 2 diabetes mellitus (H)     A1c 6.9 3/2017     Allergies   Allergen Reactions     Amlodipine Other (See Comments)     ankle swelling       Benazepril Cough     Cat Dander      Clonidine Other (See Comments) and Dizziness     mood changes    mood changes       Mold Itching     nasal     Penicillins Other (See Comments)     Unknown childhood reaction     Current Outpatient Medications   Medication Sig Dispense Refill     apixaban (ELIQUIS) 5 mg Tab tablet Take 5 mg by mouth 2 (two) times a day.       atorvastatin (LIPITOR) 80 MG tablet TAKE ONE TABLET BY MOUTH AT BEDTIME 90 tablet 2     calcium citrate-vitamin D3 250 mg calcium- 200 unit Tab Take 1 tablet by mouth 2 (two) times a day.       CARTIA  mg 24 hr capsule TAKE ONE CAPSULE BY MOUTH TWICE A  capsule 3     cholecalciferol, vitamin D3, 5,000 unit Tab Take 5,000 Units by mouth bedtime. Take as directed.       cyanocobalamin, vitamin B-12, 1,000 mcg Subl Place 1,000 mcg under the tongue daily.       multivitamin with iron (ONE DAILY WITH IRON) Tab tablet Take 1 tablet by mouth 2 (two) times a day.       olmesartan (BENICAR) 20 MG tablet Take 20 mg by mouth daily.       sertraline (ZOLOFT) 100 MG tablet TAKE 2.5 TABLETS (250MG) BY MOUTH EACH EVENING 270 tablet 2     tamsulosin (FLOMAX) 0.4 mg cap Take 1 capsule (0.4 mg total) by mouth daily after supper. 90 capsule 1     traZODone (DESYREL) 50 MG tablet Take 2 tablets (100 mg total) by mouth at bedtime. 180 tablet 3     No current facility-administered medications for this visit.      All other  "review of systems are negative.    Objective:/76 (Patient Site: Right Arm, Patient Position: Sitting, Cuff Size: Adult Large)   Pulse 63   Ht 5' 10.5\" (1.791 m)   Wt 216 lb (98 kg)   SpO2 97%   BMI 30.55 kg/m    HEENT examination shows no acute change.  The left ear appears to have healed well.  Neck supple without adenopathy or thyromegaly.  Lungs clear.  Heart regular rate and rhythm without murmur.  Abdomen shows no masses tenderness or hepatosplenomegaly.  No pitting edema at the ankle.  He does have the brownish pigmentation changes of stasis dermatitis but there is no infection in the left leg.  That has healed well since his last visit.  "

## 2021-06-02 VITALS — BODY MASS INDEX: 30.52 KG/M2 | WEIGHT: 218 LBS | HEIGHT: 71 IN

## 2021-06-02 VITALS — BODY MASS INDEX: 30.24 KG/M2 | WEIGHT: 216 LBS | HEIGHT: 71 IN

## 2021-06-02 VITALS — WEIGHT: 215 LBS | BODY MASS INDEX: 29.99 KG/M2

## 2021-06-02 VITALS — WEIGHT: 211 LBS | BODY MASS INDEX: 29.43 KG/M2

## 2021-06-02 VITALS — HEIGHT: 71 IN | WEIGHT: 217 LBS | BODY MASS INDEX: 30.38 KG/M2

## 2021-06-02 NOTE — TELEPHONE ENCOUNTER
Medication Request  Medication name: spironlolactone  Pharmacy Name and Location: [facility]  Reason for request: Patient stated Malcolm Gold MD discontinued the spironolactone back in June. Patient stated when he was in to see a nurse for his BP check last week, he told them he started back on it because his BP numbers were high again. Patient stated he gave the nurse a list of his home BP readings. Patient wants to know if Malcolm Gold MD would like him to continue taking the spironolactone or should he be on something else. Patient stated he does need a new Rx sent in by tomorrow to his mail order pharmacy.  When did you use medication last?:  Yesterday  Patient offered appointment:  n/a  Okay to leave a detailed message: yes  [phone]

## 2021-06-02 NOTE — PROGRESS NOTES
I met with Darius Escamilla at the request of Dr. Gold to recheck his blood pressure.  Blood pressure medications on the MAR were reviewed with patient.    Patient has taken all medications as per usual regimen: Yes  Patient reports tolerating them without any issues or concerns: yes- restarted Spironolet 10/21 due to increasing home BP readings.    BP:  118/76    Blood pressure was taken, previous encounter was reviewed, patient was instructed to continue current steffen until furter instruction..

## 2021-06-02 NOTE — TELEPHONE ENCOUNTER
Prescription for spironolactone 25 mg-#90 refill x1 sent to his mail order pharmacy.  Given his recent blood pressure is appropriate to go ahead and stay on that dosage and if he is feeling good then follow-up to see me in January for his next med check.  Earlier if any problems.

## 2021-06-02 NOTE — TELEPHONE ENCOUNTER
Refill Approved    Rx renewed per Medication Renewal Policy. Medication was last renewed on 6/5/19.    Julianna Figuerao, [facility] Triage/Med Refill 11/1/2019     Requested Prescriptions   Pending Prescriptions Disp Refills     tamsulosin (FLOMAX) 0.4 mg cap [Pharmacy Med Name: TAMSULOSIN CAP 0.4MG] 90 capsule 0     Sig: TAKE 1 CAPSULE DAILY AFTER SUPPER       Alfuzosin/Tamsulosin/Silodosin Refill Protocol  Passed - 10/31/2019  6:38 PM        Passed - PCP or prescribing provider visit in past 12 months       Last office visit with prescriber/PCP: 9/30/2019 Malcolm Gold MD OR same dept: 9/30/2019 Malcolm Gold MD OR same specialty: 9/30/2019 Malcolm Gold MD  Last physical: 7/17/2017 Last MTM visit: Visit date not found   Next visit within 3 mo: Visit date not found  Next physical within 3 mo: Visit date not found  Prescriber OR PCP: Malcolm Gold MD  Last diagnosis associated with med order: 1. Benign prostatic hyperplasia with nocturia  - tamsulosin (FLOMAX) 0.4 mg cap [Pharmacy Med Name: TAMSULOSIN CAP 0.4MG]; TAKE 1 CAPSULE DAILY AFTER SUPPER  Dispense: 90 capsule; Refill: 0    If protocol passes may refill for 12 months if within 3 months of last provider visit (or a total of 15 months).

## 2021-06-03 VITALS
WEIGHT: 216 LBS | SYSTOLIC BLOOD PRESSURE: 144 MMHG | OXYGEN SATURATION: 97 % | HEIGHT: 71 IN | HEART RATE: 63 BPM | BODY MASS INDEX: 30.24 KG/M2 | DIASTOLIC BLOOD PRESSURE: 76 MMHG

## 2021-06-03 VITALS — HEIGHT: 71 IN | BODY MASS INDEX: 30.94 KG/M2 | WEIGHT: 221 LBS

## 2021-06-03 VITALS — HEIGHT: 71 IN | BODY MASS INDEX: 29.96 KG/M2 | WEIGHT: 214 LBS

## 2021-06-03 VITALS — WEIGHT: 220 LBS | BODY MASS INDEX: 30.68 KG/M2

## 2021-06-03 VITALS — WEIGHT: 219 LBS | BODY MASS INDEX: 30.54 KG/M2

## 2021-06-03 NOTE — TELEPHONE ENCOUNTER
Refill Approved    Rx renewed per Medication Renewal Policy. Medication was last renewed on 12/2/18.    Philly Montgomery, [facility] Triage/Med Refill 11/17/2019     Requested Prescriptions   Pending Prescriptions Disp Refills     traZODone (DESYREL) 50 MG tablet [Pharmacy Med Name: TRAZODONE TAB 50MG] 180 tablet 0     Sig: TAKE 2 TABLETS (=100MG) AT BEDTIME       Tricyclics/Misc Antidepressant/Antianxiety Meds Refill Protocol Passed - 11/17/2019 10:22 AM        Passed - PCP or prescribing provider visit in last year     Last office visit with prescriber/PCP: 9/30/2019 Malcolm Gold MD OR same dept: 9/30/2019 Malcolm Gold MD OR same specialty: 9/30/2019 Malcolm Gold MD  Last physical: 7/17/2017 Last MTM visit: Visit date not found   Next visit within 3 mo: Visit date not found  Next physical within 3 mo: Visit date not found  Prescriber OR PCP: Malcolm Gold MD  Last diagnosis associated with med order: 1. Insomnia  - traZODone (DESYREL) 50 MG tablet [Pharmacy Med Name: TRAZODONE TAB 50MG]; TAKE 2 TABLETS (=100MG) AT BEDTIME  Dispense: 180 tablet; Refill: 0    If protocol passes may refill for 12 months if within 3 months of last provider visit (or a total of 15 months).

## 2021-06-04 VITALS
WEIGHT: 217 LBS | HEART RATE: 66 BPM | BODY MASS INDEX: 30.27 KG/M2 | DIASTOLIC BLOOD PRESSURE: 74 MMHG | SYSTOLIC BLOOD PRESSURE: 116 MMHG

## 2021-06-04 VITALS
HEART RATE: 70 BPM | DIASTOLIC BLOOD PRESSURE: 70 MMHG | WEIGHT: 215 LBS | OXYGEN SATURATION: 95 % | SYSTOLIC BLOOD PRESSURE: 116 MMHG | BODY MASS INDEX: 30.1 KG/M2 | HEIGHT: 71 IN

## 2021-06-04 NOTE — TELEPHONE ENCOUNTER
Refill Approved    Pharmacy change    Rx renewed per Medication Renewal Policy. Medication was last renewed on 4/12/19.    Brenda Flood, [facility] Triage/Med Refill 12/5/2019     Requested Prescriptions   Pending Prescriptions Disp Refills     atorvastatin (LIPITOR) 80 MG tablet [Pharmacy Med Name: ATORVASTATIN TAB 80MG] 90 tablet 0     Sig: TAKE 1 TABLET AT BEDTIME       Statins Refill Protocol (Hmg CoA Reductase Inhibitors) Passed - 12/4/2019 12:32 AM        Passed - PCP or prescribing provider visit in past 12 months      Last office visit with prescriber/PCP: 9/30/2019 Malcolm Gold MD OR same dept: 9/30/2019 Malcolm Gold MD OR same specialty: 9/30/2019 Malcolm Gold MD  Last physical: 7/17/2017 Last MTM visit: Visit date not found   Next visit within 3 mo: Visit date not found  Next physical within 3 mo: Visit date not found  Prescriber OR PCP: Malcolm Gold MD  Last diagnosis associated with med order: 1. Essential Hypercholesterolemia  - atorvastatin (LIPITOR) 80 MG tablet [Pharmacy Med Name: ATORVASTATIN TAB 80MG]; TAKE 1 TABLET AT BEDTIME  Dispense: 90 tablet; Refill: 0    If protocol passes may refill for 12 months if within 3 months of last provider visit (or a total of 15 months).

## 2021-06-05 NOTE — TELEPHONE ENCOUNTER
Refill Approved    Rx renewed per Medication Renewal Policy. Medication was last renewed on 3/14/19.5/23/19.10/30/19.    Julianna Figueroa, [facility] Triage/Med Refill 1/14/2020     Requested Prescriptions   Pending Prescriptions Disp Refills     sertraline (ZOLOFT) 100 MG tablet [Pharmacy Med Name: SERTRALINE TAB 100MG] 225 tablet 1     Sig: TAKE 2 AND 1/2 TABLETS     (=250MG) EVERY EVENING       SSRI Refill Protocol  Passed - 1/12/2020  7:44 AM        Passed - PCP or prescribing provider visit in last year     Last office visit with prescriber/PCP: 9/30/2019 Malcolm Gold MD OR same dept: 9/30/2019 Malcolm Gold MD OR same specialty: 9/30/2019 Malcolm Gold MD  Last physical: 7/17/2017 Last MTM visit: Visit date not found   Next visit within 3 mo: Visit date not found  Next physical within 3 mo: Visit date not found  Prescriber OR PCP: Malcolm Gold MD  Last diagnosis associated with med order: 1. Recurrent major depression in partial remission (H)  - sertraline (ZOLOFT) 100 MG tablet [Pharmacy Med Name: SERTRALINE TAB 100MG]; TAKE 2 AND 1/2 TABLETS     (=250MG) EVERY EVENING  Dispense: 225 tablet; Refill: 1    2. Essential hypertension  - spironolactone (ALDACTONE) 25 MG tablet [Pharmacy Med Name: SPIRONOLACT  TAB 25MG]; TAKE 1 TABLET DAILY IN THE MORNING  Dispense: 90 tablet; Refill: 1    3. Benign Essential Hypertension  - diltiazem (CARDIZEM CD) 180 MG 24 hr capsule [Pharmacy Med Name: DILTIAZEM CD /24HR]; TAKE 1 CAPSULE TWICE DAILY  Dispense: 180 capsule; Refill: 1    If protocol passes may refill for 12 months if within 3 months of last provider visit (or a total of 15 months).             spironolactone (ALDACTONE) 25 MG tablet [Pharmacy Med Name: SPIRONOLACT  TAB 25MG] 90 tablet 1     Sig: TAKE 1 TABLET DAILY IN THE MORNING       Diuretics/Combination Diuretics Refill Protocol  Passed - 1/12/2020  7:44 AM        Passed - Visit with PCP or prescribing provider visit in past 12  months     Last office visit with prescriber/PCP: 9/30/2019 Malcolm Gold MD OR same dept: 9/30/2019 Malcolm Gold MD OR same specialty: 9/30/2019 Malcolm Gold MD  Last physical: 7/17/2017 Last MTM visit: Visit date not found   Next visit within 3 mo: Visit date not found  Next physical within 3 mo: Visit date not found  Prescriber OR PCP: Malcolm Gold MD  Last diagnosis associated with med order: 1. Recurrent major depression in partial remission (H)  - sertraline (ZOLOFT) 100 MG tablet [Pharmacy Med Name: SERTRALINE TAB 100MG]; TAKE 2 AND 1/2 TABLETS     (=250MG) EVERY EVENING  Dispense: 225 tablet; Refill: 1    2. Essential hypertension  - spironolactone (ALDACTONE) 25 MG tablet [Pharmacy Med Name: SPIRONOLACT  TAB 25MG]; TAKE 1 TABLET DAILY IN THE MORNING  Dispense: 90 tablet; Refill: 1    3. Benign Essential Hypertension  - diltiazem (CARDIZEM CD) 180 MG 24 hr capsule [Pharmacy Med Name: DILTIAZEM CD /24HR]; TAKE 1 CAPSULE TWICE DAILY  Dispense: 180 capsule; Refill: 1    If protocol passes may refill for 12 months if within 3 months of last provider visit (or a total of 15 months).             Passed - Serum Potassium in past 12 months      Lab Results   Component Value Date    Potassium 3.8 09/30/2019             Passed - Serum Sodium in past 12 months      Lab Results   Component Value Date    Sodium 142 09/30/2019             Passed - Blood pressure on file in past 12 months     BP Readings from Last 1 Encounters:   10/22/19 118/76             Passed - Serum Creatinine in past 12 months      Creatinine   Date Value Ref Range Status   09/30/2019 0.84 0.70 - 1.30 mg/dL Final             diltiazem (CARDIZEM CD) 180 MG 24 hr capsule [Pharmacy Med Name: DILTIAZEM CD /24HR] 180 capsule 1     Sig: TAKE 1 CAPSULE TWICE DAILY       Calcium-Channel Blockers Protocol Passed - 1/12/2020  7:44 AM        Passed - PCP or prescribing provider visit in past 12 months or next 3 months      Last office visit with prescriber/PCP: 9/30/2019 Malcolm Gold MD OR same dept: 9/30/2019 Malcolm Gold MD OR same specialty: 9/30/2019 Malcolm Gold MD  Last physical: 7/17/2017 Last MTM visit: Visit date not found   Next visit within 3 mo: Visit date not found  Next physical within 3 mo: Visit date not found  Prescriber OR PCP: Malcolm Gold MD  Last diagnosis associated with med order: 1. Recurrent major depression in partial remission (H)  - sertraline (ZOLOFT) 100 MG tablet [Pharmacy Med Name: SERTRALINE TAB 100MG]; TAKE 2 AND 1/2 TABLETS     (=250MG) EVERY EVENING  Dispense: 225 tablet; Refill: 1    2. Essential hypertension  - spironolactone (ALDACTONE) 25 MG tablet [Pharmacy Med Name: SPIRONOLACT  TAB 25MG]; TAKE 1 TABLET DAILY IN THE MORNING  Dispense: 90 tablet; Refill: 1    3. Benign Essential Hypertension  - diltiazem (CARDIZEM CD) 180 MG 24 hr capsule [Pharmacy Med Name: DILTIAZEM CD /24HR]; TAKE 1 CAPSULE TWICE DAILY  Dispense: 180 capsule; Refill: 1    If protocol passes may refill for 12 months if within 3 months of last provider visit (or a total of 15 months).             Passed - Blood pressure filed in past 12 months     BP Readings from Last 1 Encounters:   10/22/19 118/76

## 2021-06-05 NOTE — PROGRESS NOTES
Assessment:  1.  Non-insulin-dependent diabetes mellitus well controlled since bypass and weight reduction.  2.  Coronary artery disease, asymptomatic.  3.  Hypertension, controlled.  4.  Hyperlipidemia checking status.  5.  Bilateral stasis dermatitis.  6.  Bilateral varicose veins.  7.  Depression in remission.    Plan: Check fasting A1c, lipid hepatic and basic profiles.  Recommend he follow-up with the bariatric clinic since they had suggested in February to have him do a 6-month follow-up.  Bariatric clinic can decide any further blood testing regarding that issue.  Follow-up here in 4 months regarding the above.  Recommend that he wear the bilateral knee-high support stockings for reducing risk of progression of the stasis dermatitis etc.  Call return earlier if any difficulties.  He understands and agrees.    Subjective: 64-year-old male presenting for follow-up on the above.  Regarding diabetes his A1c's have been excellent since he had the bariatric surgery and weight reduction.  He does keep physically active and is careful with his diet and has lost 5 more pounds since last summer.  Regarding the heart he denies any chest pain trouble breathing or leg swelling.  Regarding the hypertension no headaches or dizziness.  Regarding the lipids no diarrhea constipation muscle aching or muscle weakness.  He does note that his wife wanted to have his legs checked and he notes the discoloration on the legs.  He does have a large varicose veins.  He is not currently wearing the support stockings that he used to wear when he was working.  Patient Active Problem List   Diagnosis     ELINOR on CPAP     Onychomycosis     Rosacea     Allergic Rhinitis     Recurrent major depression in partial remission (H)     Essential Hypercholesterolemia     Essential hypertension     Vitamin D Deficiency     Calcaneal spur of right foot     Obesity     Degenerative joint disease (DJD) of lumbar spine     Chronic back pain     Lower leg  edema     Coronary artery disease     Hard of hearing     Low zinc level     Diabetes mellitus (H)     Paroxysmal atrial fibrillation (H)     Benign prostatic hyperplasia with nocturia     Past Medical History:   Diagnosis Date     Allergic rhinitis      Atrial fibrillation (H)      Chronic back pain      Coronary artery disease     1999-no stents, no significant damage. Echo 7/2016, EKG 1/2017     Degenerative joint disease (DJD) of lumbar spine     traumatic fall L1-L3     Depression      Hard of hearing      Hypercholesterolemia      Hyperparathyroidism (H)      Hypertension      Low zinc level      Lower leg edema      Metabolic syndrome      Rosacea      Sleep apnea     CPAP     Type 2 diabetes mellitus (H)     A1c 6.9 3/2017     Allergies   Allergen Reactions     Amlodipine Other (See Comments)     ankle swelling       Benazepril Cough     Cat Dander      Clonidine Other (See Comments) and Dizziness     mood changes    mood changes       Mold Itching     nasal     Penicillins Other (See Comments)     Unknown childhood reaction     Current Outpatient Medications   Medication Sig Dispense Refill     apixaban (ELIQUIS) 5 mg Tab tablet Take 5 mg by mouth 2 (two) times a day.       atorvastatin (LIPITOR) 80 MG tablet TAKE 1 TABLET AT BEDTIME 90 tablet 3     calcium citrate-vitamin D3 250 mg calcium- 200 unit Tab Take 1 tablet by mouth 2 (two) times a day.       cholecalciferol, vitamin D3, 5,000 unit Tab Take 5,000 Units by mouth bedtime. Take as directed.       cyanocobalamin, vitamin B-12, 1,000 mcg Subl Place 1,000 mcg under the tongue daily.       diltiazem (CARDIZEM CD) 180 MG 24 hr capsule TAKE 1 CAPSULE TWICE DAILY 180 capsule 2     multivitamin with iron (ONE DAILY WITH IRON) Tab tablet Take 1 tablet by mouth 2 (two) times a day.       olmesartan (BENICAR) 20 MG tablet Take 20 mg by mouth daily.       sertraline (ZOLOFT) 100 MG tablet TAKE 2 AND 1/2 TABLETS     (=250MG) EVERY EVENING 225 tablet 2      "spironolactone (ALDACTONE) 25 MG tablet TAKE 1 TABLET DAILY IN THE MORNING 90 tablet 2     tamsulosin (FLOMAX) 0.4 mg cap TAKE 1 CAPSULE DAILY AFTER SUPPER 90 capsule 3     traZODone (DESYREL) 50 MG tablet TAKE 2 TABLETS (=100MG) AT BEDTIME 180 tablet 2     No current facility-administered medications for this visit.      He does not smoke.  Very little alcohol.  All other review of systems are negative.  His PHQ-9 score today is 0.  He has done well for some time.    Objective:/70   Pulse 70   Ht 5' 11\" (1.803 m)   Wt 215 lb (97.5 kg)   SpO2 95%   BMI 29.99 kg/m    HEENT examination is negative.  Neck supple without adenopathy.  Lungs clear to auscultation.  Heart regular rate and rhythm without murmur.  Abdomen shows no masses tenderness or hepatosplenomegaly.  Large varicose veins in the bilateral lower legs.  Brownish discoloration consistent with the chronic stasis dermatitis.  No ulcerations.  2+ dorsalis pedis pulses bilateral.  Is alert with clear speech.  "

## 2021-06-08 NOTE — PROGRESS NOTES
"Darius Escamilla is a 64 y.o. male who is being evaluated via a billable telephone visit.      The patient has been notified of following:     \"This telephone visit will be conducted via a call between you and your physician/provider. We have found that certain health care needs can be provided without the need for a physical exam.  This service lets us provide the care you need with a short phone conversation.  If a prescription is necessary we can send it directly to your pharmacy.  If lab work is needed we can place an order for that and you can then stop by our lab to have the test done at a later time.    Telephone visits are billed at different rates depending on your insurance coverage. During this emergency period, for some insurers they may be billed the same as an in-person visit.  Please reach out to your insurance provider with any questions.    If during the course of the call the physician/provider feels a telephone visit is not appropriate, you will not be charged for this service.\"    Patient has given verbal consent to a Telephone visit? Yes    What phone number would you like to be contacted at? [phone]    Patient would like to receive their AVS by AVS Preference: Rakan.    Additional provider notes:     Assessment/Plan:  1. Type 2 diabetes mellitus without complication, without long-term current use of insulin (H)     2. Essential hypertension     3. Essential Hypercholesterolemia     4. Coronary artery disease involving native heart without angina pectoris, unspecified vessel or lesion type     5. ELINOR on CPAP       Given how he is doing at this time and the lab work that he had at the end of January, I do not think he needs lab work right at this time.  I advised him that I am retiring in early August and reviewed the alternatives here at the clinic.  He does note that the [facility] is much closer to where they live and he is thinking he will probably switch to a physician there.  I " recommend that he get established in August or September with his new physician.  If he has any symptomatic difficulty in the meantime he will certainly let us know.  Continue his regular exercise and excellent diet.  With his weight reduction from the bariatric surgery, his diabetes has been under excellent control.    Subjective: 64-year-old male presenting for follow-up on the above.  Regarding diabetes, he is actually not checking blood sugars now because of the excellent control he has had and he is maintaining his weight.  His A1c was 5.9% on January 20.  He walks on a daily basis anywhere from 1 to 3 miles.  Is following diet.  Regarding hypertension no headaches or dizziness or leg swelling.  Regarding lipids no diarrhea constipation muscle aching or muscle weakness.  Regarding the heart he denies any chest pain trouble breathing or leg swelling.  Regarding the sleep apnea he notes the CPAP works very well and he sleeps well and he does not have any daytime drowsiness.  Patient Active Problem List   Diagnosis     ELINOR on CPAP     Onychomycosis     Rosacea     Allergic Rhinitis     Essential Hypercholesterolemia     Essential hypertension     Vitamin D Deficiency     Calcaneal spur of right foot     Degenerative joint disease (DJD) of lumbar spine     Chronic back pain     Coronary artery disease     Hard of hearing     Low zinc level     Diabetes mellitus (H)     Paroxysmal atrial fibrillation (H)     Benign prostatic hyperplasia with nocturia     Overweight     Past Medical History:   Diagnosis Date     Allergic rhinitis      Atrial fibrillation (H)      Chronic back pain      Coronary artery disease     1999-no stents, no significant damage. Echo 7/2016, EKG 1/2017     Degenerative joint disease (DJD) of lumbar spine     traumatic fall L1-L3     Depression      Hard of hearing      Hypercholesterolemia      Hyperparathyroidism (H)      Hypertension      Low zinc level      Lower leg edema      Metabolic  syndrome      Rosacea      Sleep apnea     CPAP     Type 2 diabetes mellitus (H)     A1c 6.9 3/2017     Allergies   Allergen Reactions     Amlodipine Other (See Comments)     ankle swelling       Benazepril Cough     Cat Dander      Clonidine Other (See Comments) and Dizziness     mood changes    mood changes       Mold Itching     nasal     Penicillins Other (See Comments)     Unknown childhood reaction     Current Outpatient Medications   Medication Sig Dispense Refill     apixaban (ELIQUIS) 5 mg Tab tablet Take 5 mg by mouth 2 (two) times a day.       ascorbic acid, vitamin C, (VITAMIN C) 1000 MG tablet Take 1,000 mg by mouth 2 (two) times a day.       atorvastatin (LIPITOR) 80 MG tablet TAKE 1 TABLET AT BEDTIME 90 tablet 3     calcium citrate-vitamin D3 250 mg calcium- 200 unit Tab Take 1 tablet by mouth 2 (two) times a day.       cyanocobalamin, vitamin B-12, 1,000 mcg Subl Place 1,000 mcg under the tongue daily.       diltiazem (CARDIZEM CD) 180 MG 24 hr capsule TAKE 1 CAPSULE TWICE DAILY 180 capsule 2     multivitamin with iron (ONE DAILY WITH IRON) Tab tablet Take 1 tablet by mouth 2 (two) times a day.       olmesartan (BENICAR) 20 MG tablet Take 20 mg by mouth daily.       sertraline (ZOLOFT) 100 MG tablet TAKE 2 AND 1/2 TABLETS     (=250MG) EVERY EVENING 225 tablet 2     spironolactone (ALDACTONE) 25 MG tablet TAKE 1 TABLET DAILY IN THE MORNING 90 tablet 2     tamsulosin (FLOMAX) 0.4 mg cap TAKE 1 CAPSULE DAILY AFTER SUPPER 90 capsule 3     traZODone (DESYREL) 50 MG tablet TAKE 2 TABLETS (=100MG) AT BEDTIME 180 tablet 2     zinc gluconate 50 mg tablet Take 50 mg by mouth daily.       No current facility-administered medications for this visit.      All other review of systems are negative.    Objective:/74   Pulse 66   Wt 217 lb (98.4 kg)   BMI 30.27 kg/m          Phone call duration:  13 minutes    Malcolm Gold MD

## 2021-06-08 NOTE — PROGRESS NOTES
Assessment:  1.  Hypertension, not controlled.  #2.  Hyperlipidemia, check an status.  3.  Coronary artery disease, asymptomatic.  4.  Follow vitamin D deficiency.  #5.  Obesity.    Plan: Check fasting lipid hepatic basic profile and vitamin D level.  Refer him to the bariatric surgery program, if he does have bariatric surgery he wishes to have Dr. Gonzalo Tobin do that since his wife and another relative had him do their surgery of the same type.  He sees his cardiologist next week and will defer to them for further adjustment in blood pressure medication given the number of medicines etc. that he is already on as well as his allergies.  Continue efforts at diet and exercise.  Follow-up in 3-4 months or earlier here as needed.    Subjective: 61-year-old male presenting for follow-up of the above.  He notes that if he was not overweight and did not have the blood pressure problem he states he would be feeling fine.  Regarding the heart denies any chest pain trouble breathing or leg swelling.  Regarding hypertension no headaches or dizziness.  Regarding lipids no diarrhea or constipation or muscle aching or muscle weakness.  He notes his cardiologist has suggested that he pursue bariatric surgery.  Regarding vitamin D he is taking 2 tablets a day of the 5000 units currently since the early part of December because of [facility].  Past Medical History   Diagnosis Date     Allergic rhinitis      Atrial fibrillation      Coronary artery disease      Depression      Hypercholesterolemia      Hypertension      Rosacea      Sleep apnea      Current Outpatient Prescriptions   Medication Sig Dispense Refill     ascorbic acid (ASCORBIC ACID WITH SETH HIPS) 500 MG tablet Take 500 mg by mouth daily.       aspirin 325 MG tablet Take 325 mg by mouth daily.       atorvastatin (LIPITOR) 80 MG tablet TAKE ONE TABLET BY MOUTH AT BEDTIME 90 tablet 1     calcium carbonate-vitamin  D3 (CALCIUM 600 + D,3,) 600 mg(1,500mg) -400 unit per tablet Take 1 tablet by mouth 2 (two) times a day.       cholecalciferol, vitamin D3, 5,000 unit Tab Take 5,000 Units by mouth bedtime. Take as directed.       clopidogrel (PLAVIX) 75 mg tablet TAKE ONE TABLET BY MOUTH EVERY DAY 90 tablet 1     diltiazem (CARDIZEM CD) 180 MG 24 hr capsule TAKE 1 CAPSULE (180 MG TOTAL) BY MOUTH 2 (TWO) TIMES A DAY. 180 capsule 1     dronedarone (FOR MULTAQ) 400 mg tablet Take 400 mg by mouth 2 (two) times a day.       methyldopa (ALDOMET) 500 MG tablet TAKE ONE TABLET BY MOUTH EVERY 12 HOURS 180 tablet 3     metoprolol succinate (TOPROL-XL) 50 MG 24 hr tablet 50 mg 2 (two) times a day.        olmesartan-hydrochlorothiazide (BENICAR HCT) 20-12.5 mg per tablet Take 2 tablets by mouth.       potassium chloride SA (K-DUR,KLOR-CON) 20 MEQ tablet TAKE ONE TABLET FIVE TIMES DAILY 450 tablet 1     sertraline (ZOLOFT) 100 MG tablet Take 2 tablets qd 180 tablet 1     No current facility-administered medications for this visit.      Allergies   Allergen Reactions     Amlodipine Other (See Comments)     ankle swelling       Benazepril Cough     Clonidine Other (See Comments)     mood changes       Penicillins Other (See Comments)     Unknown childhood reaction     All other review of systems negative for any changes.    Objective:  Visit Vitals     /84 (Patient Site: Left Arm, Patient Position: Sitting, Cuff Size: Adult Large)     Pulse 78     Resp 18     Ht 6' (1.829 m)     Wt (!) 281 lb (127.5 kg)     BMI 38.11 kg/m2     HEENT examination shows no acute change.  TMs are normal.  He does wear bilateral hearing aids.  Neck supple.  Lungs clear.  Heart regular rate and rhythm without murmur.  Abdomen shows no masses tenderness or hepatosplenomegaly.  No pitting edema at the ankles.  He has gained further weight.

## 2021-06-09 NOTE — PROGRESS NOTES
Assessment:  1.  Left leg subcutaneous localized swelling.  2.  Hypertension, controlled.    Plan: We'll check urinalysis for any significant protein red cells etc., but if that is normal I think it would be unlikely for nephrology consult to be of benefit for him.  He is waiting to hear back from Dr. Arora of the hypertension clinic at [place] after he had seen the physician's assistant there.  He will continue with the hydralazine 10 mg 3 times a day.  Recommend observing the left leg for now and explained that I thought it would be more risk than benefit to try to aspirate any fluid from the swollen areas.  He understands and agrees.  Regular follow-up in late April or early May.    Subjective: 61-year-old male who presents for evaluation of several lumps in the left leg.  Also asks my thoughts about seeing a kidney specialist.  He had been seen by a physician's assistant at the [place] [facility].  Had been put on hydralazine 10 mg 3 times a day for his high blood pressure.  He is continuing to work through a possible bariatric surgery.  He notes that 7-8 weeks ago he had fallen and injured the leg and could've had some bruising.  And then it was about 3 weeks ago or so that he noticed some lumps in the left leg.  He can be a little tender but no fever.  No drainage.  And they have not changed in the last several weeks.  Past Medical History:   Diagnosis Date     Allergic rhinitis      Atrial fibrillation      Coronary artery disease      Depression      Hypercholesterolemia      Hypertension      Rosacea      Sleep apnea      Current Outpatient Prescriptions   Medication Sig Dispense Refill     ascorbic acid (ASCORBIC ACID WITH SETH HIPS) 500 MG tablet Take 500 mg by mouth daily.       aspirin 325 MG tablet Take 325 mg by mouth daily.       atorvastatin (LIPITOR) 80 MG tablet TAKE ONE TABLET BY MOUTH AT BEDTIME 90 tablet 1     calcium carbonate-vitamin D3 (CALCIUM 600 + D,3,) 600 mg(1,500mg) -400 unit per  tablet Take 1 tablet by mouth 2 (two) times a day.       cholecalciferol, vitamin D3, 5,000 unit Tab Take 5,000 Units by mouth bedtime. Take as directed.       clopidogrel (PLAVIX) 75 mg tablet TAKE ONE TABLET BY MOUTH EVERY DAY 90 tablet 1     diltiazem (CARDIZEM CD) 180 MG 24 hr capsule TAKE 1 CAPSULE (180 MG TOTAL) BY MOUTH 2 (TWO) TIMES A DAY. 180 capsule 1     dronedarone (FOR MULTAQ) 400 mg tablet Take 400 mg by mouth 2 (two) times a day.       hydrALAZINE (APRESOLINE) 10 MG tablet Take 10 mg by mouth.       methyldopa (ALDOMET) 500 MG tablet TAKE ONE TABLET BY MOUTH EVERY 12 HOURS 180 tablet 3     metoprolol succinate (TOPROL-XL) 50 MG 24 hr tablet 50 mg 2 (two) times a day.        olmesartan-hydrochlorothiazide (BENICAR HCT) 20-12.5 mg per tablet Take 2 tablets by mouth.       potassium chloride SA (K-DUR,KLOR-CON) 20 MEQ tablet TAKE ONE TABLET FIVE TIMES DAILY 450 tablet 3     sertraline (ZOLOFT) 100 MG tablet TAKE 2 TABLETS BY MOUTH EVERY  tablet 1     No current facility-administered medications for this visit.      Allergies   Allergen Reactions     Amlodipine Other (See Comments)     ankle swelling       Benazepril Cough     Clonidine Other (See Comments)     mood changes       Penicillins Other (See Comments)     Unknown childhood reaction     Objective:  Visit Vitals     /80     Pulse 90     Wt (!) 279 lb 14.4 oz (127 kg)     BMI 37.96 kg/m2     Left leg exam shows that there are several fluid collections lateral to the proximal tibia in the anterior leg area consistent with fluid accumulation, no ecchymosis, no ulceration, no erythema, no pustules, no tenderness to palpation, and it is far enough away from the knee joint that it does not look like a ganglion cyst.  Very trace edema lower in the ankle.  Some slight hyperpigmentation in the anterior shin consistent with resolving ecchymosis.  There is no vesicle or bullae present but the swelling is beneath the depth of the skin.

## 2021-06-09 NOTE — PROGRESS NOTES
I spoke with Darius this morning to follow up with him after his initial bariatric consult with Dr. Arias.  He has [employer] and will only be required to have 3 months of SWL visits, or clearance from the dietician.  He will need to be cleared by psych and finish his needs list before scheduling a surgical consult.  He understands and will contact me if he has any questions or concerns during this process

## 2021-06-09 NOTE — PROGRESS NOTES
Patient has a strong familial history of morbid obesity.  His wife also has bariatric surgery about 10 years ago.  He is working on improving his eating behaviors.  He likes sweets and soda.   He also has Dysthymia and takes Zoloft 20 mg. Qd.

## 2021-06-09 NOTE — PROGRESS NOTES
Patient has lost about 14 lbs in the past 3 weeks.  He is doing well with his eating behaviors, and his MMPI, QOLI and MAST were normal.

## 2021-06-09 NOTE — PROGRESS NOTES
Outpatient Bariatric Medicine Consultation   Indication: Medical Bariatric Consultation to [facility]  Primary Provider: Malcolm Gold MD  Requesting Physician: Dr. Watts  Type of Bariatric Surgery: Noris en Y Gastric Bypass      Impression    Darius Escamilla is a 61 y.o. year old male with  has a past medical history of Allergic rhinitis; Atrial fibrillation; Chronic back pain; Coronary artery disease; Degenerative joint disease (DJD) of lumbar spine; Depression; Hard of hearing; Hypercholesterolemia; Hypertension; Lower leg edema; Rosacea; and Sleep apnea.  Poor functional capacity and musculoskeletal disability due to morbid obesity which satisfies NIH criteria for bariatric surgery. His  Body mass index is 39.33 kg/(m^2)..  Darius Escamilla hopes to achieve getting off of his BP meds and be able to move easier following surgery and significant weight loss.    Bariatric Recommendations   Bariatric therapy is indicated for Darius as a means of modifying his obesity related co-morbidities.  Therapeutic lifestyle changes have not lead to significant and or durable weight loss.  Surgical bariatric therapy is most likely to induce significant weight loss, promote long-term weight maintenance and lead to clinical improvement and/or resolution of his weight related co-morbidities.     Bariatric Surgery Requirements   Medical Nutrition Therapy including comprehensive evaluation, guidance and clearance is required.  Bariatric Psychological Assessment and clearance is required.  Bariatric Laboratory studies are indicated and were ordered today.  Routine Healthcare Maintenance must be current prior to bariatric surgery.  Colonoscopy: UTD 2008  Mammogram: NA  Pap: NA  Support Group Attendance one time is required prior to surgery, encouraged thereafter.  Lifelong vitamin supplementation is required.  Lifelong follow up is indicated.  Physical Activity Plan is indicated, was discussed and will be reinforced each  visit.  Non-Smoking status must be achieved a minimum of 60 days prior to surgery and he should remain a non-smoker indefinitely following bariatric surgery.     Perioperative Recommendations   CARDIAC: Cardiac consultation and clearance will be required of patients with significant cardiac disease and/or multiple cardiac risk factors. I will call  regarding coagulation management.  PULMONARY: Pre-operative therapy with CPAP/BIPAP is indicated for a minimum of one month for patients with sleep apnea. Complete tobacco abstinence for two months pre-operatively and indefinitely thereafter is required.   RENAL: Diuretics will be discontinued 2 weeks before surgery at the time of liquid diet if the patient is on them at that time.  ENDOCRINE: Optimizing perioperative glycemic control is indicated. Our goal is an AIC of 8 or less at the time of surgery for optimal healing.   GASTROINTESTINAL: Evaluation of the esophagus and stomach by EGD and/or UGI series will be considered in patients with severe GERD, previous weight loss surgery, or other indication.  GYNECOLOGIC: For patients on Estrogen- Estrogen will be discontinued 4 weeks prior to surgery and resumed 4 weeks after surgery unless otherwise advised. Reliable contraception is required post-operatively for 1 year for women of childbearing age. DEPOT PROVERA is contraindicated due to its association with weight gain. Post-operative birth control plan is NA  MUSCULOSKELETAL/RHEUMATOLOGIC: NSAIDS are contraindicated following surgery and lifelong abstinence is indicated. When indicated for cardioprotection or otherwise, patients should use an enteric coated ASA and concomitant proton pump inhibitor.  HEMATOLOGIC: Risks of deep venous thromboembolism have been assessed. Patients with history of DVT/PE or current anti-coagulation will be placed on the High Risk DVT Prophylaxis Protocol. Objections (if any) to receiving blood products if necessary have been  documented.  DENTAL: Reasonable and functional dentition is indicated in order to chew food to applesauce consistency post-operatively.  NUTRITIONAL: Pre and post-operative nutritional and lifestyle modification guidance is indicated. Pre-operative weight reduction can reduce liver volume, improving technical aspects of surgery.    History Surrounding Consultation   Struggles with weight started as a kid  His weight at age 16 was ?  He has had several past supervised and unsupervised weight loss attempts  The most weight lost was: 40#  Unfortunately there was not durable weight maintenance.  History of bulimia, anorexia, or binge eating disorder? no  If Present has eating disorder been in remission at least 3 years? NA  Night time eating? no    Dietary History   Meals per day: 3  Snacks: too many  Typical Snack: ice cream, chips, pop  Who does the grocery shopping? He or his wife  Who does the cooking? His wife  A typical meal includes: B: coffee, [place] toast, cereal, banana L: leftovers D: chicken, lasagne, steak  Regular Pop: 2/d  Juice: not a lot  Caffeine: 1-2/d  Amount of restaurant eating per week: 0-2  Eating a the table with the TV off? yes    Physical Activity Pattern   Current physical activity routine includes: walks the dogs daily for about 1/2 hour. Stopped strength training d/t migrating pain, knee, foot, shoulder    Limitations from being physically active on a regular basis includes: pain but does walk consistently    He describes his general health as: fair    Past Medical History     Past Medical History:   Diagnosis Date     Allergic rhinitis      Atrial fibrillation      Chronic back pain      Coronary artery disease     1999-no stents, no significant damage     Degenerative joint disease (DJD) of lumbar spine     traumatic fall L1-L3     Depression      Hard of hearing      Hypercholesterolemia      Hypertension      Lower leg edema      Rosacea      Sleep apnea      Dyslipidemia: yes  Obesity  "Hypoventilation: no  DM2: no DM1: NA DX: NA Most recent AIC: NA  Neuropathy: no  Nephropathy: no  Retinopathy: no  Glaucoma:no  IFG or \"pre-DM\": no  MI: yes 1999 by angio was a clot but no heart damage  CVA:no  CHF: no  Heart Valves: no  Previous cardiac testing includes: EKG recently Echo within the year  Cancers: no  Kidney Disease: no  Colitis: no  Crohn's: no  PUD: no  HX UGI/EGD:no  Asthma: no  Back Pain:yes  DDD: yes  Gout: no  Severe Headaches: no  Seizures: no If so, last seizure: no  Pseudotumor: No  PCOS: NA  Menstrual Irregularity: NA  Menorrhagia: NA  Infertility: NA  Thyroid problems: no  Thyroid medications: no  Glaucoma: no  HIV positive: no  MRSA/VRE history: no  History of Blood transfusion: no  Anemia: no    Medications     Current Outpatient Prescriptions   Medication Sig Dispense Refill     ascorbic acid (ASCORBIC ACID WITH SETH HIPS) 500 MG tablet Take 500 mg by mouth daily.       aspirin 325 MG tablet Take 325 mg by mouth daily.       atorvastatin (LIPITOR) 80 MG tablet TAKE ONE TABLET BY MOUTH AT BEDTIME 90 tablet 1     calcium carbonate-vitamin D3 (CALCIUM 600 + D,3,) 600 mg(1,500mg) -400 unit per tablet Take 1 tablet by mouth 2 (two) times a day.       cholecalciferol, vitamin D3, 5,000 unit Tab Take 5,000 Units by mouth bedtime. Take as directed.       clopidogrel (PLAVIX) 75 mg tablet TAKE ONE TABLET BY MOUTH EVERY DAY 90 tablet 1     diltiazem (CARDIZEM CD) 180 MG 24 hr capsule TAKE 1 CAPSULE (180 MG TOTAL) BY MOUTH 2 (TWO) TIMES A DAY. 180 capsule 1     dronedarone (FOR MULTAQ) 400 mg tablet Take 400 mg by mouth 2 (two) times a day.       hydrALAZINE (APRESOLINE) 10 MG tablet Take 10 mg by mouth.       methyldopa (ALDOMET) 500 MG tablet TAKE ONE TABLET BY MOUTH EVERY 12 HOURS 180 tablet 3     metoprolol succinate (TOPROL-XL) 50 MG 24 hr tablet 50 mg 2 (two) times a day.        olmesartan-hydrochlorothiazide (BENICAR HCT) 20-12.5 mg per tablet Take 2 tablets by mouth.       potassium " chloride SA (K-DUR,KLOR-CON) 20 MEQ tablet TAKE ONE TABLET FIVE TIMES DAILY 450 tablet 3     sertraline (ZOLOFT) 100 MG tablet TAKE 2 TABLETS BY MOUTH EVERY  tablet 1     No current facility-administered medications for this visit.        Allergies    Amlodipine; Benazepril; Cat dander; Clonidine; Mold; and Penicillins    Past Surgical History     Past Surgical History:   Procedure Laterality Date     PA REMOVAL OF HEEL SPUR Right 3/10/2015    Procedure: RETROCALCANEAL BONE SPUR EXCISION, RIGHT FOOT;  Surgeon: Fantasma Skinner DPM;  Location: [facility];  Service: Podiatry     PA REMV CATARACT EXTRACAP,INSERT LENS      Description: Extracaps Cataract Extract With Prosthesis Insert Left Eye;  Proc Date: 04/16/2009;  Comments: Dr. David Schmitz-     History of problems with anesthesia: no  History of Malignant Hyperthermia: NO    Gynecologic History       Family History     family history includes Alcohol abuse in his father; Diabetes in his brother, maternal aunt, maternal grandfather, maternal grandmother, maternal uncle, and mother; Heart disease in his brother, maternal aunt, maternal grandfather, maternal grandmother, maternal uncle, mother, and sister; Hyperlipidemia in his sister and sister; Hypertension in his brother, father, maternal aunt, maternal grandfather, maternal grandmother, maternal uncle, mother, sister, and sister; Sleep apnea in his brother, brother, brother, and brother; Snoring in his brother, brother, brother, brother, brother, and brother; Stroke in his brother.    Social History     Social History     Social History     Marital status:      Spouse name: N/A     Number of children: N/A     Years of education: N/A     Occupational History     Not on file.     Social History Main Topics     Smoking status: Never Smoker     Smokeless tobacco: Never Used     Alcohol use Yes      Comment: 1-5 per month or less     Drug use: No     Sexual activity: Yes     Partners: Female  "    Other Topics Concern     Not on file     Social History Narrative    , 2 children one in [place] one [place]    2 grandsons who play soccer    Works for [employer] for over 40 yrs. Plans to retire in 2018       Psychiatric History   Diagnoses: depression  Treated by: zoloft  Psychiatric Hospitalizations: no  Suicide attempts: no  ECT: no  Panic attacks: no  History of Abuse: no    Palliative Medicine History   Involvement in a pain clinic: no    Dental History   Missing teeth: no  Pending Dental Work: no  Regular Dental Visits: yes  Dentures/Partials: no    Review of Systems   Snoring: ELINOR  Witnessed Apneas ELINOR  PND ELINOR  [place] Score: ELINOR  STOP BANG: ELINOR  General  Fatigue: 5-6/10  Sleep Quality:5-6 hours, needs 8 and 9  HEENT  Visual changes: no  Cardiovascular  Murmur: no  Elevated BP: yes  Chest Pain with Exertion: no  Dyspnea with Exertion: yes  Palpitations: no  Lower Extremity Edema: yes  Syncope: no  Pulmonary  Shortness of breath at rest: no  Wheezing: no  CPAP use: yes, for about 10+ yrs  Gastrointestinal  Trouble swallowing:no  Heartburn: no  Abdominal pain: no  Hematochezia: no  Urologic  Hesitancy: yes  Urgency: no polyuria yes  USI no  Genitourinary  ED:   Menorrhagia: NA  Dysmenorrhea:  Neurologic  Severe headache:no  Paresthesias: no  Psychiatric  Moods Stable: yes  Hallucinations: no  Rheumatologic  Myalgias: yes  Arthralgias: yes  Endocrine  Polydipsia: yes  Polyuria: yes  Galactorrhea: no  Heat intolerance: no  Hirsutism: no  Musculoskeletal  Joint pain:yes  Falls: no  Use of cane, crutch or motorized scooter: no  Hematologic  Abnormal Bleeding or Clotting: no  Dermatologic  Skin Tags: yes  Striae: no  Furuncless: no  Acne: no  Intertrigo: no  Lower Leg ulcers: no    Physical Exam   Vitals:   Visit Vitals     /88     Pulse 62     Ht 5' 11.25\" (1.81 m)     Wt (!) 284 lb (128.8 kg)     SpO2 98%     BMI 39.33 kg/m2     Height:   Ht Readings from Last 1 Encounters:   03/10/17 5' 11.25\" " "(1.81 m)     Weight:   Wt Readings from Last 1 Encounters:   03/10/17 (!) 284 lb (128.8 kg)     Height: 5' 11.25\" (1.81 m) (3/10/2017  8:08 AM)  Initial Weight: 284 lbs (3/10/2017  8:08 AM)  Weight: 284 lb (128.8 kg) (3/10/2017  8:08 AM)  Weight loss from initial: 0 (3/10/2017  8:08 AM)  % Weight loss: 0 % (3/10/2017  8:08 AM)  BMI (Calculated): 39.3 (3/10/2017  8:08 AM)  SpO2: 98 % (3/10/2017  8:08 AM)  Heart Rate (/min): 62 (3/10/2017  8:08 AM)  BP (mmHg): 174/97 (3/10/2017  8:08 AM)  Waist Circumference (In): 55 Inches (3/10/2017  8:08 AM)  Hip Circumference (In): 47 Inches (3/10/2017  8:08 AM)  Neck Circumference (In): 20.5 Inches (3/10/2017  8:08 AM)  NSAIDS: Yes (3/10/2017  8:08 AM)  Drinks per week: 0 (3/10/2017  8:08 AM)  Pain Scale: 0 (3/10/2017  8:08 AM)  Body mass index is 39.33 kg/(m^2).    General Appearance  No acute distress. Obesity: central  Alert: yes  Sleepy: no  Ambulatory without a device: yes  SUNNI  PERRLASABINA Melampati Score: 4+  Neck  Stout: 20 No carotid bruits  Cardiovascular  Rhythm regular Rate Regular  Murmur: no  Pulmonary  [place] Score: ELINOR  Lungs end expiratory wheezes to ascultation, moving air well. Color is good. No distress  Abdomen  Soft, NT/ND No rebound, no guarding  No rashes.   Post surgical Scars: no  Extremities:  Pitting edema: bilateral, compression stocking on right leg  Palpable distal pulses: 2+  Varicose veins: yes  Neurologic  Tremors: no  Psychiatric  Thought Content Organized  Mood appears stable  Endocrine  Moon Facies: NO  Dorsal Thoracic Prominence: NO  Skin tags: yes  Acanthosis nigricans: hint  Purple Striae: no  Dermatologic  Intertrigo: no    Counseling/Education   We reviewed the important post op bariatric recommendations:  -eating 3 meals daily  -eating protein first, getting >60gm protein daily 80gm if duodenal switch  -eating slowly, chewing food well  -avoiding/limiting calorie containing beverages  -drinking water 15-30 minutes before or after " meals  -choosing wheat, not white with breads, crackers, pastas, pardeep, bagels, tortillas, rice  -limiting restaurant or cafeteria eating to twice a week or less    We discussed the importance of restorative sleep and stress management in maintaining a healthy weight.  We discussed the National Weight Control Registry healthy weight maintenance strategies and ways to optimize metabolism.  We discussed the importance of physical activity including cardiovascular and strength training in maintaining a healthier weight.  We discussed the importance of lifelong vitamin supplementation and lifelong follow-up.    Darius Escamilla was reminded that, postoperatively,  to avoid marginal ulcers he should avoid tobacco at all, alcohol in excess, caffeine in excess, and NSAIDS (unless indicated for cardioprotection or othewise and opposed by a PPI).     He was reminded that after bariatric surgery alcohol will affect him differently and he should not drive after consuming even one alcoholic beverage.  Thank you for the opportunity to participate in the care of your patient.  Yoko Arias MD, FAAFP      60 minutes spent with patient. >50% in counseling.

## 2021-06-10 NOTE — PROGRESS NOTES
Patient was informed of a resident following the surgeon today. Patient understood and confirmed it was ok for resident in the exam room.       I was consulted by Malcolm Gold MD to evaluate this pt for Bariatric Surgery.    HPI: Darius Escamilla is a 61 y.o. male here today for consideration of metabolic and bariatric surgery. he has had weight problems for the last 25 years. he Has tried multiple weight loss programs in the past with good success.  40 lbs  he carries most of his/her obesity in through their abdomen, and hips.   This pt also experiences Hypertention which is currently controlled with meds.  He is still working with cardiology to control his HTN   This pt does not have GERD.   The pt has sleep apnea which is being treated with a CPAP machine.   This pt does not have diabetes.  The patient also has A. Fib and is on multaq. And plavix.  He is s/p an MI in 1999.  ECHO in 2016 has an EF of 59%    .  .      Allergies:Amlodipine; Benazepril; Cat dander; Clonidine; Mold; and Penicillins    Past Medical History:   Diagnosis Date     Allergic rhinitis      Atrial fibrillation      Chronic back pain      Coronary artery disease     1999-no stents, no significant damage. Echo 7/2016, EKG 1/2017     Degenerative joint disease (DJD) of lumbar spine     traumatic fall L1-L3     Depression      Hard of hearing      Hypercholesterolemia      Hyperparathyroidism      Hypertension      Low zinc level      Lower leg edema      Metabolic syndrome      Rosacea      Sleep apnea      Type 2 diabetes mellitus     A1c 6.9 3/2017       Past Surgical History:   Procedure Laterality Date     COLONOSCOPY  2008     DC REMOVAL OF HEEL SPUR Right 3/10/2015    Procedure: RETROCALCANEAL BONE SPUR EXCISION, RIGHT FOOT;  Surgeon: Fantasma Skinner DPM;  [facility]: [facility];  Service: Podiatry     DC REMV CATARACT EXTRACAP,INSERT LENS      Description: Extracaps Cataract Extract With Prosthesis Insert Left Eye;  Proc Date:  "04/16/2009;  Comments: Dr. David Schmitz-       CURRENT MEDS:      Family History   Problem Relation Age of Onset     Diabetes Mother      Hypertension Mother      Heart disease Mother      Alcohol abuse Father      Hypertension Father      Diabetes Brother      Hypertension Brother      Sleep apnea Brother      Snoring Brother      Stroke Brother      Diabetes Maternal Aunt      Hypertension Maternal Aunt      Heart disease Maternal Aunt      Diabetes Maternal Uncle      Hypertension Maternal Uncle      Heart disease Maternal Uncle      Diabetes Maternal Grandmother      Hypertension Maternal Grandmother      Heart disease Maternal Grandmother      Diabetes Maternal Grandfather      Hypertension Maternal Grandfather      Heart disease Maternal Grandfather      Sleep apnea Brother      Snoring Brother      Sleep apnea Brother      Snoring Brother      Sleep apnea Brother      Snoring Brother      Snoring Brother      Snoring Brother      Hyperlipidemia Sister      Heart disease Sister      Hypertension Sister      Heart disease Brother      Hyperlipidemia Sister      Hypertension Sister         reports that he has never smoked. He has never used smokeless tobacco. He reports that he drinks alcohol. He reports that he does not use illicit drugs.    Review of Systems - 12 point Review of Systems is negative except for the issues mentioned above.    PSYCHIATRIC: he has undergone a lifestyle assessment and has been deemed a good candidate for bariatric surgery by the psychologist.    /84 (Patient Site: Right Arm, Patient Position: Sitting, Cuff Size: Adult Large)  Pulse (!) 52  Ht 5' 11.25\" (1.81 m)  Wt (!) 265 lb 9.6 oz (120.5 kg)  SpO2 95%  BMI 36.78 kg/m2  Body mass index is 36.78 kg/(m^2).    EXAM:  GENERAL: This is a well-developed 61 y.o. male who appears his stated age  HEAD & NECK: Grossly normal.  No palpable thyroid lesions  CARDIAC: RRR without murmur  CHEST/LUNG:  Clear to auscultation  ABDOMEN: " Obese.  Nontender.  No hernias or masses appreciated.  LYMPHATIC:  No significant adenopathy appreciated.    EXTREMITIES: Grossly normal.  No evidence of chronic venous stasis.    NEUROLOGIC: Focally intact  INTEGUMENT: No open lesions or ulcers  PSYCHIATRIC: Normal affect. he has a good grasp on the nature of his obesity and the treatment options.    LABS:  Lab Results   Component Value Date    WBC 5.8 03/10/2017    HGB 14.3 03/10/2017    HCT 43.1 03/10/2017    MCV 90 03/10/2017     03/10/2017     INR/Prothrombin Time      Lab Results   Component Value Date    HGBA1C 6.9 (H) 03/10/2017     Lab Results   Component Value Date    ALT 28 01/12/2017    AST 17 01/12/2017    ALKPHOS 93 01/12/2017    BILITOT 0.8 01/12/2017       Assessment/Plan: 61 y.o. male who is an excellent candidate for bariatric and metabolic surgery.  After a careful conversation with the patient it was decided that a  Laparoscopic Sleeve Gastrectomy. would be his best option.     He will need cardiac clearance before surgery and he will need to stop his plavix one week before surgery to be started again right after surgery.      I went over the surgery in detail with him.  I went over the nature of the operation and some of the potential consequences of the surgery.  I went over the expected hospital course and discussed laparoscopic versus open surgery, understanding that we will plan on doing this laparoscopically with the possibility of having to convert to an open operation.  I went over some of the risks and complications of the operation including, but not limited to, DVT, pulmonary emboli, pneumonia, postoperative bleeding, wound infection, staple line leak, intra-abdominal sepsis, and possible death.  I also went over some of the potential nutritional concerns such as vitamin B-12, iron, vitamin D, vitamin A, calcium and protein deficiencies.  I will also went over the need for lifelong nutritional surveillance.  The patient  understands and wants to proceed with surgery.  We will submit for prior authorization.      Ivan Watts MD  [facility] Department of Surgery

## 2021-06-10 NOTE — PROGRESS NOTES
Initial Structured Weight Loss Supervised Diet Evaluation     Assessment:  Pt. is a 61 y.o. male is being seen today for initial RD nutritional evaluation. Pt. has been unsuccessful with non-surgical weight loss methods and is interested in bariatric surgery. Today we reviewed current eating habits and level of physical activity, and instructed on the changes that are required for successful bariatric outcomes.    Pt's wife had bariatric surgery 10 years ago and had good results. Pt wants to get his blood pressure in control, back pain, and low energy. Pt wants to get off of blood pressure medication. Pt has two therapy dogs (Blanche and Daniel) that he likes to bring into hospitals and nursing homes.      Workflow review: Pt attended support group, psych completed, initial lab work order  Weight goal: at or below initial     Pt's Initial Weight: 284 lbs  Weight: 268 lb 6.4 oz (121.7 kg)  Weight loss from initial: 15.6  % Weight loss: 5.49 %  BMI: 37     Estimated RMR ([place]-St Jeor equation): 2037 calories    Food allergies, intolerances, and Baptism customs: none       Vitamins   Educated on post-op vitamin regimen: Multi Vit + iron 2x/day, calcium citrate 500-600 mg 2x/day, 8538-4813 mcg of Sublingual B-12 daily, and 5000 IU Vitamin D3 daily.    Biggest struggle with weight loss:yo-yo dieting. Pt struggles with portion sizes, pop intake, and grazing.     Diet/Weight History  Method or Diet: Exercise regimen, self imposed calorie restriction.   # loss(-) or gain(+): -40 lb     Who does the grocery shopping for your household? Pt and wife  Who prepares your meals at home? Pt's wife     Diet Recall/Time: Pt wakes up at 4:15am (Pt works for [employer])   Breakfast: banana   Am Snack: Indonesian toast or cereal or light greek yogurt   Lunch: sandwich or leftovers, fruit, peanuts  Pm snack: SF pudding  Dinner: salads, fish or chicken.   HS Snack: SF pudding, popcorn or granola bar.       Typical Snacks: chips, peanuts,  salty foods.     Fats used at home: olive oil, butter    Fried Foods: 1 times per week    Meals per week away from home: 1  Sit down: none  Fast Food: chinese   Take Out: none  Delivery: none   Buffet: none  Cafeteria: none  Specialty Coffee: none  Ice Cream/FrozenYogurt/Bakery: Ice cream- Pt tries to keep out of house.   Comments: n/a     Recommended limiting eating out to no more than 2x/week.  Patient and I reviewed the importance of eating three consistent meals per day; as well as meal timing to be spaced 4-5 hours apart.  Snack choices: 100-150 calories (1-2x/day if physically hungry), incorporating a fruit/vegetable w/ protein source.    Portion Sizes problematic? Yes per patient/diet recall  Encouraged slowing meal times down, 20-30 minutes, chewing to applesauce consistency.   To aid in proper portion control and slow meal time down discussed consuming meals off smaller plates, use toddler/children utensils and set utensils down after each bite.    Protein, vegetables/fruits, carbohydrates:   Reviewed lean protein sources today. Recommended consuming 15-20gm protein at 3 meals daily    Beverages (Type/Oz. per day)  Water: 16 oz  Coffee: none  Tea: none  Milk: none  Regular soda: 1-2 bottles/week mountain dew  Diet soda: Dt. Pepsi 3 bottles/week  Juice: none  Dimitrios-Aid/lemonade/etc: none  Alcohol: none     Discussed the importance of adequate hydration after surgery and the goal of 64+ oz. of fluid daily.   The patient understands the importance of avoiding all carbonated, caffeinated and sweetened drinks; instead choosing 64 oz. plain water.    Fluid-meal separation:  The patient and I reviewed the anatomy of the bypass and why  fluids from a meal is so important.    Exercise  Walking and ADLs.     Pt. s understands that 30-60 minutes of daily activity is an important part of bariatric surgery success.   Encouraged pt. to incorporate upper body strength training exercise, even if its lifting soup  cans while watching TV at night, doing pushups/sit-ups, and abdominal work.    PES statement:   1. (NI-1.3)Excessive energy intake related to Food and nutrition related knowledge deficit concerning excessive energy/oral intake as evidenced by Intake of high caloric density foods/beverages ( soda) at meals and/or snacks; large portions; frequent grazing; Estimated intake that exceeds estimated daily energy intake; and BMI 37.        Intervention  Discussion:    1. Educated pt on the [place] to Bariatric Success handout.  2. Reviewed lean protein sources today. Recommended consuming 15-20gm protein at 3 meals daily.  3. Gave food journal homework, to be completed for f/u appointment.  4. Bariatric Plate: The patient and I discussed the importance of including lean/low  fat protein at each meal and limiting carbohydrate intake to less  than 25% of plate volume.    Instructions/Goals:     1. Include 15-20gm lean protein at each meal.  2. Increase vegetable/fruit intake, by having a vegetable or fruit with each meal daily. Recommended pt to increase vegetable/fruit intake to 4-5 servings daily.  3. Increase fluid intake to 64oz daily: choose plain or calorie/carbonation-free beverages.  4. Incorporate daily structured activity, 30-60 minutes most days of the week  5. Fill out food journal and bring to next month's visit.  6. Practice plate method: 1/2 plate lean/low fat protein source, vegetable/fruit, <25% of plate complex carbohydrates.  7. Read food labels more consistently: keeping total fat grams <10, total sugar grams <10, fiber >3gm per serving.  8. Separate fluids 30 minutes before/after meal times.  9. Practice eating off of smaller plates/bowls, chewing to applesauce consistency, taking 20-30 minutes to eat in a calm/relaxed environment without distractions of tv/email/cell phone.    Handouts Provided:  Pt. brought [facility] Bariatric Care Patient Handbook  Protein supplement list  Soup Can Exercises (10 bicep  curls, 10 shoulder press, 10 lunges, 10 squats, 30 sec. Wall sit, 2-3x/day)    Monitor/Evaluation:  Pt. s target weight: no gain from initial visit, pt. verbalized understanding.     Has realistic expectations for weight loss: Yes  Verbalizes understanding of dietary changes post procedure: No  Verbalizes understanding of supplement needs: No  Verbalizes willingness to participate in physical activity: Yes  Motivation for change: average  Client s predicted compliance on a scale of 1 (low) to 10 (high): 7  RD s prediction for client success and compliance on a scale of 1 (low) to 10 (high):  7    Plan for next visit:     Review Bariatric plate and food journal homework.  Educate on dumping syndrome   Educate on supplements   (Final Supervised Diet visit with RD) pre/post-op  diet progression, give review of surgery process.  Review [place] to Bariatric Success  Check Understanding Quiz    Time In: 2:00pm  Time Out: 2:45pm    ABN signed: Yes

## 2021-06-10 NOTE — PROGRESS NOTES
I have submitted a prior authorization request on behalf of this patient to [facility] to be approved for a LSG with Dr. Ivan Watts.  Copy to [place] to scan to patients chart

## 2021-06-10 NOTE — PROGRESS NOTES
Cleared by RD. Workflow updated and reviewed.    Ready for AB Consult.    Rhonda Mccarthy CMA  [facility]  P: [phone]  F: [phone]

## 2021-06-10 NOTE — PROGRESS NOTES
Workflow updated. Patient will need to have cardiology clearance prior to scheduling surgery.  Lea Samson RN

## 2021-06-10 NOTE — PROGRESS NOTES
Follow Up Surgical Weight Loss Supervised Diet Evaluation  Assessment:  Pt presents for follow up supervised diet visit with RD.    This patient is a 61 y.o.  He is being seen today for follow-up nutritional evaluation. Darius Escamilla has been unsuccessful with non-surgical weight loss methods and is interested in bariatric surgery. Today we reviewed the patients current eating habits and level of physical activity, and instructed on the changes that are required for successful bariatric outcomes.    Workflow review: Pt attended support group, labs completed, psych completed.   Weight goal: at or below initial.     Pt's Initial Weight: 284 lbs  Weight: 258 lb 1.6 oz (117.1 kg)  Weight loss from initial: 25.9  % Weight loss: 9.12 %  Body mass index is 35.75 kg/(m^2).    Calculated RMR ([place]-St Jeor equation): 2037 calories    Progress made since last visit: Pt reports doing well with food, however struggling with not drinking enough water throughout the day. Pt likes premier protein.   Concerns: Pt made great progress since last visit. Pt still needs to work on  fluids from meals, increasing fluid intake, eliminate coffee.       Diet Recall/Time:   Breakfast: protein shake w/ fruit (30g)  Am Snack: peanuts or string cheese  Lunch: turkey sandwich spinach (20g)  Pm snack:none   Dinner: fish, salad (20g)   HS Snack: none    Meals per week away from home: 0-1     Recommended limiting eating out to no more than 2x/week.  Patient and I reviewed the importance of eating three consistent meals per day; as well as meal timing to be spaced 4-5 hours apart.  Snack choices: 100-150 calories (1-2x/day if physically hungry), incorporating a fruit/vegetable w/ protein source.    Meal Duration:15 minutes  Encouraged slowing meal times down, 20-30 minutes, chewing to applesauce consistency.     Portion Sizes problematic? Yes Per patient/diet recall   To aid in proper portion control and slow meal time down discussed  consuming meals off smaller plates, use toddler/children utensils and set utensils down after each bite.    Protein, vegetables/fruits, carbohydrates:   The patient and I discussed the importance of including lean/low fat protein at each meal and limiting carbohydrate intake to less than 25% of plate volume.     Vitamins   Post-op vitamin regimen: Multi Vit + iron 2x/day, calcium citrate 500-600 mg 2x/day, 3002-3902 mcg of Sublingual B-12 daily, and 5000 IU Vitamin D3 daily.    Beverages (Type/Oz. per day)  Water: 35 oz  Coffee: 1 cup  Tea: none   Milk: w/ cereal  Regular soda: none   Diet soda: none   Juice: none  Dimitrios-Aid/lemonade/etc: none   Alcohol: none     Discussed the importance of adequate hydration after surgery and the goal of 64+ oz of fluid daily.   The patient understands the importance of avoiding all carbonated, caffeinated and sweetened drinks; and instead choose 64 oz plain water.    Fluid-meal separation:   Pt is not working on  fluids 30min before and 30 minutes after meals.  Fluids are not  30min before and 30 minutes after meals.    Exercise  Walking dogs daily.     Pt's understands that 30-60 minutes of daily activity is an important part of bariatric surgery success.   Encouraged pt to incorporate upper body strength training exercise, even if its lifting soup cans while watching TV at night, doing pushups/sit-ups, and abdominal work.    PES statement:   1. (NI-1.3)Excessive energy intake related to Food and nutrition related knowledge deficit concerning excessive energy/oral intake as evidenced by Intake of high caloric density foods at meals and/or snacks; fast meal pace; caffeine intake, and BMI 35.         Intervention:  Discussion:   1. Reviewed the [place] to Bariatric Success handout.  2. Reviewed lean protein sources and recommended to consume 15-20gm protein at 3 meals daily.  3. Gave pt Check Your Understanding Quiz, and assessed readiness for change.  4. Educated on  pre/post-op diet progression, post-op vitamin regimen, gave review of surgery process.  5. Dumping syndrome: choosing foods with less than 5-10gm fat/sugar per serving to avoid dumping syndrome for [facility] pts.  6. Reviewed Bariatric plate and food journal homework.  7. Bariatric Plate: The patient and I discussed the importance of including lean/low  fat protein at each meal and limiting carbohydrate intake to less  than 25% of plate volume.    Instructions/Goals:   1. Include 15-20gm protein at each meal.  2. Increase vegetable/fruit intake, by having a vegetable or fruit with each meal daily. Recommended pt to increase vegetable/fruit intake to 4-5 servings daily.  3. Increase fluid intake to 64oz daily: choose plain or calorie/carbonation-free beverages.  4. Incorporate daily structured activity, 30-60 minutes most days of the week  5. Practice plate method: 1/2 plate lean/low fat protein source, vegetable/fruit, <25% of plate complex carbohydrates.  6. Read food labels more consistently: keeping total fat grams <10, total sugar grams <10, fiber >3gm per serving.  7. Separate fluids 30 minutes before/after meal times.  8. Practice eating off of smaller plates/bowls, chewing to applesauce consistency, taking 20-30 minutes to eat in a calm/relaxed environment without distractions of tv/email/cell phone.    Handouts provided:  Pt. [facility] Bariatric Care Patient Handbook    Monitor/Evaluation:     Pt understands the importance of not gaining any weight from initial recorded weight.      Has realistic expectations for weight loss: Yes  Verbalizes understanding of dietary changes post procedure: Yes  Verbalizes understanding of supplement needs: Yes  Verbalizes willingness to participate in physical activity: Yes  Motivation for change: average  Client s predicted compliance on a scale of 1 (low) to 10 (high): 7  RD s prediction for client success and compliance on a scale of 1 (low) to 10 (high):  7    Pt has  made the necessary changes, and is knowledgeable and well-informed of the dietary and physical activity requirements that are necessary for successful bariatric outcomes. This Pt is an appropriate candidate for surgery from a nutrition standpoint at this time. The patient understands that surgery is a tool, and not a cure, and our aftercare program must be followed.    Time In: 2:00pm  Time Out: 2:30pm      ABN signed: Yes

## 2021-06-11 NOTE — PROGRESS NOTES
Assessment/Plan:      Visit for Preoperative Exam.    1. Obesity     2. Preop cardiovascular exam  Electrocardiogram Perform - Clinic   3. Coronary artery disease  Electrocardiogram Perform - Clinic   4. Type 2 diabetes mellitus  Electrocardiogram Perform - Clinic   5. Benign Essential Hypertension  Electrocardiogram Perform - Clinic   6. Essential Hypercholesterolemia  Lipid Cascade   7. Vitamin D deficiency  Vitamin D, Total (25-Hydroxy)   8. Pre-op testing  HM1(CBC and Differential)    Comprehensive Metabolic Panel    APTT(PTT)    INR    Glycosylated Hemoglobin A1c    Zinc, Serum    HM1 (CBC with Diff)    Urinalysis- if Indicated         Patient approved for surgery with general or local anesthesia. Labs will be done as indicated. Proceed with proposed surgery without additional clinical clarifications.   He knows to hold the aspirin and clopidogrel for 10 days before the procedure.  He will not take any of his vitamins etc. on the morning of the procedure.  He will take his blood pressure medicines, dronedarone  And cetirizine with sip of water on the morning of the procedure.  Did explain that he was technically diagnosed with diabetes based on the A1c of 6.9% back in March and we are rechecking that today, but would expect that with significant weight reduction after the procedure that that becomes controlled very well.    Subjective:     Scheduled Procedure: Gastric Bypass/Sleeve  Surgery Date:  08/07/17  Surgery Location:  [facility]  Surgeon:  Dr. Watts    This 61-year-old male has tried all conservative ways of reducing weight and he has difficulty with hypertension that has been hard to control.  He is gone to the bariatric program evaluation and is appropriately proceeding now with the scheduled procedure.  He continues to try to be very careful with his diet etc.  Current Outpatient Prescriptions   Medication Sig Dispense Refill     aMILoride (MIDAMOR) 5 MG tablet Take 5 mg by mouth.       ascorbic  acid (ASCORBIC ACID WITH SETH HIPS) 500 MG tablet Take 500 mg by mouth daily.       aspirin 325 MG tablet Take 325 mg by mouth daily.       atorvastatin (LIPITOR) 80 MG tablet TAKE ONE TABLET BY MOUTH AT BEDTIME 90 tablet 1     calcium carbonate-vitamin D3 (CALCIUM 600 + D,3,) 600 mg(1,500mg) -400 unit per tablet Take 1 tablet by mouth 2 (two) times a day.       cetirizine (ZYRTEC) 10 MG tablet Take 10 mg by mouth daily.       chlorthalidone (HYGROTEN) 25 MG tablet Take 25 mg by mouth.       cholecalciferol, vitamin D3, 5,000 unit Tab Take 5,000 Units by mouth bedtime. Take as directed.       clopidogrel (PLAVIX) 75 mg tablet Take 1 tablet (75 mg total) by mouth daily. Please keep 7/17 appointment 90 tablet 0     diltiazem (CARDIZEM CD) 180 MG 24 hr capsule Take 1 capsule (180 mg total) by mouth 2 (two) times a day. 180 capsule 0     dronedarone (FOR MULTAQ) 400 mg tablet Take 400 mg by mouth 2 (two) times a day.       metoprolol succinate (TOPROL-XL) 50 MG 24 hr tablet Take 100 mg by mouth.       multivitamin with minerals (THERA-M) 9 mg iron-400 mcg Tab tablet Take 1 tablet by mouth daily.       olmesartan (BENICAR) 40 MG tablet Take 40 mg by mouth.       potassium chloride SA (K-DUR,KLOR-CON) 20 MEQ tablet TAKE ONE TABLET FIVE TIMES DAILY 450 tablet 3     sertraline (ZOLOFT) 100 MG tablet TAKE 2 TABLETS BY MOUTH EVERY  tablet 1     No current facility-administered medications for this visit.        Allergies   Allergen Reactions     Amlodipine Other (See Comments)     ankle swelling       Benazepril Cough     Cat Dander      Clonidine Other (See Comments) and Dizziness     mood changes    mood changes       Mold      Penicillins Other (See Comments)     Unknown childhood reaction       Immunization History   Administered Date(s) Administered     DT (pediatric) 10/30/2002     Hep A, historic 06/16/2008, 12/18/2008     Influenza V1b1-70, 12/29/2009     Influenza high dose, seasonal 09/30/2016     Influenza,  inj, historic 10/29/2007, 11/03/2008, 10/18/2011, 10/10/2012     Influenza, seasonal,quad inj 36+ mos 10/28/2015     Influenza, seasonal,quad inj 6-35 mos 12/15/2009, 10/26/2010, 10/15/2013     Influenza,seasonal quad, PF 10/08/2014     Td, historic 02/22/2011     Tdap 02/22/2011     ZOSTER 07/07/2015       Patient Active Problem List   Diagnosis     Obstructive Sleep Apnea     Onychomycosis     Rosacea     Allergic Rhinitis     Recurrent major depression in partial remission     Foot Pain (Soft Tissue)     Essential Hypercholesterolemia     Benign Essential Hypertension     Coronary Arteriosclerosis     Vitamin D Deficiency     Calcaneal spur of right foot     Obesity     Degenerative joint disease (DJD) of lumbar spine     Chronic back pain     Lower leg edema     Coronary artery disease     Hard of hearing     Hyperparathyroidism     Low zinc level     Type 2 diabetes mellitus     Metabolic syndrome       Past Medical History:   Diagnosis Date     Allergic rhinitis      Atrial fibrillation      Chronic back pain      Coronary artery disease     1999-no stents, no significant damage. Echo 7/2016, EKG 1/2017     Degenerative joint disease (DJD) of lumbar spine     traumatic fall L1-L3     Depression      Hard of hearing      Hypercholesterolemia      Hyperparathyroidism      Hypertension      Low zinc level      Lower leg edema      Metabolic syndrome      Rosacea      Sleep apnea      Type 2 diabetes mellitus     A1c 6.9 3/2017       Social History     Social History     Marital status:      Spouse name: N/A     Number of children: N/A     Years of education: N/A     Occupational History     Not on file.     Social History Main Topics     Smoking status: Never Smoker     Smokeless tobacco: Never Used     Alcohol use Yes      Comment: 1-5 per month or less     Drug use: No     Sexual activity: Yes     Partners: Female     Other Topics Concern     Not on file     Social History Narrative    , 2  "children one in [place] one [place]    2 grandsons who play soccer    Works for [employer] for over 40 yrs. Plans to retire in 2018       Past Surgical History:   Procedure Laterality Date     COLONOSCOPY  2008     [place] REMOVAL OF HEEL SPUR Right 3/10/2015    Procedure: RETROCALCANEAL BONE SPUR EXCISION, RIGHT FOOT;  Surgeon: Fantasma Skinner DPM;  Location: [facility];  Service: Podiatry     [place] REMV CATARACT EXTRACAP,INSERT LENS      Description: Extracaps Cataract Extract With Prosthesis Insert Left Eye;  Proc Date: 04/16/2009;  Comments: Dr. David Schmitz-       History of Present Illness  Recent Health  Fever: no  Chills: no  Fatigue: no  Chest Pain: no  Cough: no  Dyspnea: no  Urinary Frequency: yes, same as for some time, no dysuria  Nausea: no  Vomiting: no  Diarrhea: no  Abdominal Pain: no  Easy Bruising: yes, on aspirin and clopidogrel  Lower Extremity Swelling: no  Poor Exercise Tolerance: no    Most recent Health Maintenance Visit:      Pertinent History  Prior Anesthesia: yes  Previous Anesthesia Reaction:  no  Diabetes: yes  Cardiovascular Disease: yes  Pulmonary Disease: no  Renal Disease: no  GI Disease: no  Sleep Apnea: yes  Thromboembolic Problems: no  Clotting Disorder: no  Bleeding Disorder: no  Transfusion Reaction: no  Impaired Immunity: no  Steroid use in the last 6 months: no  Frequent Aspirin use: yes    Family history of no anesthesia reaction    Social history of patient does not wear denture or partial plates, there is no transfusion refusal and there are no concerns regarding care after surgery    After surgery, the patient plans to recover at home with family.    Review of Systems  Review of Systems   All other review of systems are negative.          Objective:         Vitals:    07/17/17 0959   BP: 120/78   Pulse: (!) 53   Resp: 16   SpO2: 93%   Weight: (!) 259 lb (117.5 kg)   Height: 5' 11.5\" (1.816 m)       Physical Exam:  Physical Exam   Head normocephalic.  External ears and TMs " normal.  Eyes show pupils equal round and react to light and accommodation.  Nose and oropharynx negative.  Neck supple without adenopathy or thyromegaly.  Lungs clear to auscultation.  Heart regular rate and rhythm without murmur.  Abdomen shows no masses tenderness or hepatosplenomegaly.  No cervical axillary or groin adenopathy.  Genitalia show normal testes, no hernia.  Rectal examination shows mild smooth enlargement of the prostate with no focal nodules.  Good peripheral pulses including dorsalis pedis pulses.  Does have some minor varicose veins in the lower legs and trace edema.    EKG shows sinus bradycardia.  No acute change.  Chest x-ray does not show any acute infiltrate by my reading.

## 2021-06-11 NOTE — PROGRESS NOTES
Patient attended group class to review pre-op instructions for upcoming surgery.  Discussed admission process and hospital course.  Pharmacy information packet given and explained. Patient was given exercises to work on post-op for maintaining muscle mass and strengthening that was created by Physical Therapy.  Bariatric quiz reviewed. Rationale for correct answers given and pt. verbalized understanding. Discharge instructions, information card and follow up appointments given and reviewed with pt at this time and patient verbalized understanding.      Pt had his pre-op H&P, labs, EKG and CXR done 7/17/17 at [facility].    Bertha Coronado RN, N  [facility] Surgery and Bariatric Care  P [phone]  F [phone]

## 2021-06-11 NOTE — PROGRESS NOTES
Orders placed for pre-op testing including labs, EKG and CXR which pt will have done 7/17/17 at [facility] when he is there for his pre-op H&P.    Bertha Coronado RN, CBN  [facility] Surgery and Bariatric Care  P [phone]  F [phone]

## 2021-06-11 NOTE — PROGRESS NOTES
Rec'd cardiac clearance from MD Anyi and workflow updated.    Bertha Coronado RN, [facility] Surgery and Bariatric Care  P [phone]  F [phone]

## 2021-06-12 NOTE — PROGRESS NOTES
BMP ordered per pt's cardiologist's recommendations to be drawn on the morning of surgery.      Bertha Coronado RN, N  [facility] Surgery and Bariatric Care  P [phone]  F [phone]

## 2021-06-12 NOTE — PROGRESS NOTES
Time in: 1:00 /Time out: 2:00  .  Initial Weight: 284 lbs  Weight: 261 lb (118.4 kg)  Weight loss from initial: 23  % Weight loss: 8.1 %    Weight goal: At or below initial weight    Pt presents for nutrition education class for liquid diets. Educated pt on 2 week preop and 1 week post op liquid diets. Discussed appropriate liquids and demonstrated portions for each of the food groupings during each diet phase. Reviewed appropriate calories/protein/fluid goals during 2 week pre-op liquid diet. Educated on correct vitamins/minerals to take after surgery in correct dosage and frequency. Provided grocery list and sample menu plan for each diet stage, as well as unflavored protein powder samples.      Pt will begin 2 week preop liquid diet 7/24/2017, will do clear liquids the day before surgery. Pt is scheduled for LSG on 8/7/2017, and will then follow two weeks  post op liquid diet. Pt  will f/u with RD 1 week post op for further diet advancement.

## 2021-06-12 NOTE — PROGRESS NOTES
BMP cancelled and the order will be put in by [facility] so that it's linked to the pt's surgery.    Bertha Coronado RN, CBN  [facility]  P [phone]  F [phone]

## 2021-06-12 NOTE — PROGRESS NOTES
Order for Durable Medical Equipment was processed and equipment ordered.   DME provider: [facility]  Date Faxed: 8/3/17  Ordering Provider:   Equipment ordered: Head gear for mask/CHERRI

## 2021-06-12 NOTE — PROGRESS NOTES
Order for Durable Medical Equipment was processed and equipment ordered.   DME provider: [facility]   Date Faxed: 8/11/17  Ordering Provider:   Equipment ordered: Cpap supplies

## 2021-06-12 NOTE — ANESTHESIA POSTPROCEDURE EVALUATION
Patient: Darius Escamilla  LAPAROSCOPIC SLEEVE GASTRECTOMY  Anesthesia type: general    Patient location: PACU  Last vitals:   Vitals:    08/07/17 1430   BP: 153/71   Pulse: (!) 54   Resp: 20   Temp: 36.7  C (98  F)   SpO2: 95%     Post vital signs: stable  Level of consciousness: awake and responds to simple questions  Post-anesthesia pain: pain controlled  Post-anesthesia nausea and vomiting: no  Pulmonary: unassisted, face mask  Cardiovascular: stable and blood pressure at baseline  Hydration: adequate  Anesthetic events: no    QCDR Measures:  ASA# 11 - Gris-op Cardiac Arrest: ASA11B - Patient did NOT experience unanticipated cardiac arrest  ASA# 12 - Gris-op Mortality Rate: ASA12B - Patient did NOT die  ASA# 13 - PACU Re-Intubation Rate: ASA13B - Patient did NOT require a new airway mgmt  ASA# 10 - Composite Anes Safety: ASA10A - No serious adverse event  ASA# 38 - New Corneal Injury: ASA38A - No new exposure keratitis or corneal abrasion in PACU    Additional Notes:

## 2021-06-12 NOTE — PROGRESS NOTES
Dear Dr. Malcolm Gold MD  [address] [facility]  [facility]  [address]  [place] [address],    Thank you for the opportunity to participate in the care of Darius Escamilla.     He is a 62 y.o. y/o male patient who comes to the sleep medicine clinic for his yearly follow up.    He was diagnosed with severe ELINOR (AHI=31)  and has been using CPAP therapy. He was previously evaluated by Dr. Carias.    Current PAP settings: P= 16 cwp.     He denies any PAP intolerance. He is using the device every night and tolerates the pressure well.     Residual AHI: a2.9  Leak: 0  Compliance: 100%    Mask Tolerance: excellent  Skin irritation: no      Past Medical History:   Diagnosis Date     Allergic rhinitis      Atrial fibrillation      Chronic back pain      Coronary artery disease     1999-no stents, no significant damage. Echo 7/2016, EKG 1/2017     Degenerative joint disease (DJD) of lumbar spine     traumatic fall L1-L3     Depression      Hard of hearing      Hypercholesterolemia      Hyperparathyroidism      Hypertension      Low zinc level      Lower leg edema      Metabolic syndrome      Rosacea      Sleep apnea     CPAP     Type 2 diabetes mellitus     A1c 6.9 3/2017       Past Surgical History:   Procedure Laterality Date     COLONOSCOPY  2008     NH LAP, ADITHYA RESTRICT PROC, LONGITUDINAL GASTRECTOMY N/A 8/7/2017    Procedure: LAPAROSCOPIC SLEEVE GASTRECTOMY;  Surgeon: Ivan Watts MD;  Location: [facility] OR;  Service: General     NH REMOVAL OF HEEL SPUR Right 3/10/2015    Procedure: RETROCALCANEAL BONE SPUR EXCISION, RIGHT FOOT;  Surgeon: Fantasma Skinner DPM;  Location: [facility] OR;  Service: Podiatry     NH REMV CATARACT EXTRACAP,INSERT LENS      Description: Extracaps Cataract Extract With Prosthesis Insert Left Eye;  Proc Date: 04/16/2009;  Comments: Dr. David Schmitz-       Social History     Social History     Marital status:      Spouse name: N/A     Number of children: N/A     Years of  education: N/A     Occupational History     Not on file.     Social History Main Topics     Smoking status: Never Smoker     Smokeless tobacco: Never Used     Alcohol use Yes      Comment: 1-5 per month or less     Drug use: No     Sexual activity: Yes     Partners: Female     Other Topics Concern     Not on file     Social History Narrative    , 2 children one in [place] one [place]    2 grandsons who play soccer    Works for [employer] for over 40 yrs. Plans to retire in 2018       Review of Systems:  General: No weight gain, no weight loss  Eyes: No vision changes  ENT: No hearing changes  Cardio: No chest pain, no nocturnal dyspnea  Respiratory: No shortness of breath, no cough  Gastrointestinal: No diarrhea, no constipation  Genitourinary: No excessive urination  Tegumentary: No rashes  Neurological: No seizures, no loss of consciousness  Endo: No heat or cold intolerance.    Current Outpatient Prescriptions   Medication Sig Dispense Refill     atorvastatin (LIPITOR) 80 MG tablet Take 80 mg by mouth at bedtime.       calcium carbonate-vitamin D3 (CALCIUM 600 + D,3,) 600 mg(1,500mg) -400 unit per tablet Take 1 tablet by mouth 2 (two) times a day.       cholecalciferol, vitamin D3, 5,000 unit Tab Take 5,000 Units by mouth bedtime. Take as directed.       clopidogrel (PLAVIX) 75 mg tablet Take 1 tablet (75 mg total) by mouth daily. Please keep 7/17 appointment 90 tablet 0     cyanocobalamin, vitamin B-12, 1,000 mcg Subl Place 1,000 mcg under the tongue daily.       diltiazem (CARDIZEM CD) 180 MG 24 hr capsule Take 1 capsule (180 mg total) by mouth 2 (two) times a day. 180 capsule 0     dronedarone (FOR MULTAQ) 400 mg tablet Take 400 mg by mouth 2 (two) times a day.       HYDROmorphone (DILAUDID) 2 MG tablet Take 1-2 tablets (2-4 mg total) by mouth every 4 (four) hours as needed for pain (moderate pain). 30 tablet 0     HYDROmorphone (DILAUDID) 2 MG tablet Take 1-2 tablets (2-4 mg total) by mouth every 4  "(four) hours as needed (moderate pain (4-6) give 2 mg, severe pain (7-10) give 4 mg). 20 tablet 0     metoprolol succinate (TOPROL-XL) 25 MG Take 2 tablets (50 mg total) on 8/10, then continue 1 tablet (25 mg total) daily 30 tablet 0     olmesartan (BENICAR) 40 MG tablet Take 40 mg by mouth daily.        omeprazole (PRILOSEC) 20 MG capsule Take 1 capsule (20 mg total) by mouth daily. For the first 6 weeks, open capsule & sprinkle contents into liquids or onto food. 90 capsule 0     pediatric multivitamin (FLINTSTONES) Chew chewable tablet Chew 1 tablet 2 (two) times a day.       sertraline (ZOLOFT) 100 MG tablet Take 200 mg by mouth every evening.       tamsulosin (FLOMAX) 0.4 mg Cp24 Take 1 capsule (0.4 mg total) by mouth daily after supper. 30 capsule 0     [START ON 8/21/2017] ursodiol (ACTIGALL) 300 mg capsule Take 1 capsule (300 mg total) by mouth 2 (two) times a day. Start 2 wks after surgery. Do not cut, crush or open. Take with warm liquids. 180 capsule 1     No current facility-administered medications for this visit.        Allergies   Allergen Reactions     Amlodipine Other (See Comments)     ankle swelling       Benazepril Cough     Cat Dander      Clonidine Other (See Comments) and Dizziness     mood changes    mood changes       Mold Itching     nasal     Penicillins Other (See Comments)     Unknown childhood reaction       Physical Exam:  /68  Pulse (!) 56  Ht 5' 11\" (1.803 m)  Wt (!) 251 lb (113.9 kg)  SpO2 96%  BMI 35.01 kg/m2  BMI:Body mass index is 35.01 kg/(m^2).   GEN: NAD, obese  Neurological: Alert, oriented to time, place, and person.  Psych: normal mood, normal affect     Labs/Studies:     I reviewed the efficacy and compliance report from his device. Data summarized on the HPI and the CPAP compliance flow sheet.     Lab Results   Component Value Date    WBC 5.4 07/17/2017    HGB 14.8 07/17/2017    HCT 44.6 07/17/2017    MCV 92 07/17/2017     07/17/2017         Chemistry  "       Component Value Date/Time     08/07/2017 0806    K 4.3 08/07/2017 0806     08/07/2017 0806    CO2 27 08/07/2017 0806    BUN 17 08/07/2017 0806    CREATININE 0.86 08/07/2017 0806    GLU 69 (L) 08/07/2017 0806        Component Value Date/Time    CALCIUM 9.9 08/07/2017 0806    ALKPHOS 74 07/17/2017 1047    AST 18 07/17/2017 1047    ALT 31 07/17/2017 1047    BILITOT 0.7 07/17/2017 1047            Lab Results   Component Value Date    FERRITIN 57 03/10/2017           Assessment and Plan:  In summary Darius Escamilla is a 62 y.o. year old male who is here for follow up.    1. Obstructive Sleep Apnea.  Residual AHI is excellent  Compliance is excellent  Patient is benefiting from using PAP therapy.  Continue with P= 16 cwp..    We counseled the patient on the importannce of using his PAP device every night and the risks of not treating sleep apnea.      Patient verbalized understanding of these issues, agrees with the plan and all questions were answered today. Patient was given an opportuntity to voice any other symptoms or concerns not listed above. Patient did not have any other symptoms or concerns.      Patient told to return in 12 months.     MD MARILUZ Shell Board Certified in Internal Medicine and Sleep Medicine  [facility].

## 2021-06-12 NOTE — PROGRESS NOTES
"HPI: Pt is here for follow up of a Laparoscopic Sleeve Gastrectomy on August 7th, 2017. he is doing well. Taking po well. He is drinking 50-64 ounces of fluid daily. No vomiting. No fevers or chills. Ambulating without problems.       /66  Pulse 64  Ht 5' 11\" (1.803 m)  Wt (!) 238 lb (108 kg)  SpO2 96%  BMI 33.19 kg/m2  Wt Readings from Last 3 Encounters:   08/24/17 (!) 238 lb (108 kg)   08/11/17 (!) 251 lb (113.9 kg)   08/09/17 (!) 251 lb 12.8 oz (114.2 kg)     Body mass index is 33.19 kg/(m^2).    EXAM:  GENERAL:Appears well  ABDOMEN: Incisions healing well      Assessment/Plan: Pt s/p Laparoscopic Sleeve Gastrectomy. Doing well. Diet and activity discussed. he will f/u with us [facility] the [facility] in 3 months.    Ivan Watts MD  [facility] Department of Surgery  "

## 2021-06-12 NOTE — ANESTHESIA PREPROCEDURE EVALUATION
Anesthesia Evaluation      Patient summary reviewed   No history of anesthetic complications     Airway   Mallampati: II  Neck ROM: full   Pulmonary - normal exam   (+) sleep apnea on CPAP, severe,   (-) shortness of breath                         Cardiovascular - normal exam  Exercise tolerance: > or = 4 METS  (+) hypertension, past MI, CAD, dysrhythmias (paroxysmal AF), , hypercholesterolemia,     (-) angina  ECG reviewed        Neuro/Psych    (+) depression, chronic pain    Endo/Other    (+) diabetes mellitus type 2, arthritis, obesity,      GI/Hepatic/Renal       Other findings: Obstructive Sleep Apnea     Onychomycosis    Rosacea    Allergic Rhinitis    Recurrent major depression in partial remission    Foot Pain (Soft Tissue)    Essential Hypercholesterolemia    Benign Essential Hypertension    Coronary Arteriosclerosis    Vitamin D Deficiency    Calcaneal spur of right foot    Obesity    Degenerative joint disease (DJD) of lumbar spine    Chronic back pain    Lower leg edema    Coronary artery disease    Hard of hearing    Hyperparathyroidism    Low zinc level    Type 2 diabetes mellitus    Metabolic syndrome          Dental    (+) caps                       Anesthesia Plan  Planned anesthetic: general endotracheal  50 mg ketamine IV on induction.    ASA 3   Induction: intravenous   Anesthetic plan and risks discussed with: patient  Anesthesia plan special considerations: antiemetics,   Post-op plan: routine recovery

## 2021-06-12 NOTE — ANESTHESIA CARE TRANSFER NOTE
Last vitals:   Vitals:    08/07/17 1400   BP: 154/77   Pulse: 64   Resp: 16   Temp: 37  C (98.6  F)   SpO2: 97%     Patient's level of consciousness is drowsy  Spontaneous respirations: yes  Maintains airway independently: yes  Dentition unchanged: yes  Oropharynx: oropharynx clear of all foreign objects    QCDR Measures:  ASA# 20 - Surgical Safety Checklist: WHO surgical safety checklist completed prior to induction  PQRS# 430 - Adult PONV Prevention: 4558F - Pt received => 2 anti-emetic agents (different classes) preop & intraop  ASA# 8 - Peds PONV Prevention: NA - Not pediatric patient, not GA or 2 or more risk factors NOT present  PQRS# 424 - Gris-op Temp Management: 4559F - At least one body temp DOCUMENTED => 35.5C or 95.9F within required timeframe  PQRS# 426 - PACU Transfer Protocol: - Transfer of care checklist used  ASA# 14 - Acute Post-op Pain: ASA14B - Patient did NOT experience pain >= 7 out of 10

## 2021-06-12 NOTE — PROGRESS NOTES
Time in: 10:00/Time out: 11:00      Pt presents for 1 week post op dietitian follow up. Reports tolerating full liquids well. Denies n/v, constipation/diarrhea, or significant pain. Taking MVI and SL B12 appropriately. Taking adequate fluids/day. Educated pt on pureed and soft to bariatric regular diets. Provided grocery list and sample meal plan for each diet stage.    Pt will begin pureed diet on 8/21/2017 and advance as tolerated to softs/bariatric regular on 9/11/2017. Instructed pt to begin 500 mg calcium citrate BID and 5000IU Vitamin D3 when starting the soft/regular bariatric diet phase. Pt to follow up with RD at 3 months post op.

## 2021-06-13 NOTE — PROGRESS NOTES
Assessment:  1.  Non-insulin-dependent diabetes mellitus, checking status.  2.  Coronary artery disease, doing well.  3.  Hypertension controlled.  4.  Hyperlipidemia, checking status.  5.  Status post gastric bypass surgery with ongoing successful weight reduction.  6.  Paroxysmal atrial fibrillation, he is pursuing probable ablation in January.  7.  Decreased sexual libido, possibly medication related, probably multifactorial.  But normal serum testosterone level in March of this year.  8.  Chronic stasis dermatitis more on the right leg than left leg.    Plan: Check fasting A1c, lipid hepatic and basic profiles.  Given influenza immunization.  Continue current medication regimen.  Continue excellent diet and exercise efforts.  He knows he will probably be back in December for the preop for his ablation issue.  Otherwise plan follow-up every 4 months on the various issues.    Subjective: 62-year-old male presenting for follow-up on the above.  Had the laparoscopic sleeve gastrectomy in early August of this year.  Has been doing well and significant weight reduction.  Regarding the heart he denies any chest pain trouble breathing and notes the leg swelling is about the same.  He normally wears a support stocking on the right leg.  He did have monitor in late August that did show periodic atrial fibrillation and he notes cardiologist is talking about probably doing ablation in January.  He is hoping he might be able to get off of the dronedarone after that.  He does feel intermittent fast heart rate or palpitations and it usually lasts a matter of minutes and then clears.  Regarding diabetes, he has had very significant weight reduction since the surgery and would expect improvement in his diabetic control.  Regarding hypertension no headaches or dizziness.  Regarding lipids no diarrhea constipation muscle aching or muscle weakness.  He does note that he does not have the same sexual drive that he had in the past.   He does not have trouble getting an erection.  Past Medical History:   Diagnosis Date     Allergic rhinitis      Atrial fibrillation      Chronic back pain      Coronary artery disease     1999-no stents, no significant damage. Echo 7/2016, EKG 1/2017     Degenerative joint disease (DJD) of lumbar spine     traumatic fall L1-L3     Depression      Hard of hearing      Hypercholesterolemia      Hyperparathyroidism      Hypertension      Low zinc level      Lower leg edema      Metabolic syndrome      Rosacea      Sleep apnea     CPAP     Type 2 diabetes mellitus     A1c 6.9 3/2017     Allergies   Allergen Reactions     Amlodipine Other (See Comments)     ankle swelling       Benazepril Cough     Cat Dander      Clonidine Other (See Comments) and Dizziness     mood changes    mood changes       Mold Itching     nasal     Penicillins Other (See Comments)     Unknown childhood reaction     Current Outpatient Prescriptions   Medication Sig Dispense Refill     apixaban (ELIQUIS) 5 mg Tab tablet Take 5 mg by mouth 2 (two) times a day.       atorvastatin (LIPITOR) 80 MG tablet Take 80 mg by mouth at bedtime.       calcium carbonate-vitamin D3 (CALTRATE 600 PLUS D3) 600 mg(1,500mg) -400 unit per tablet Take 1 tablet by mouth daily.       CARTIA  mg 24 hr capsule TAKE ONE CAPSULE BY MOUTH TWICE A  capsule 0     cholecalciferol, vitamin D3, 5,000 unit Tab Take 5,000 Units by mouth bedtime. Take as directed.       clopidogrel (PLAVIX) 75 mg tablet TAKE ONE TABLET BY MOUTH EVERY DAY *PLEASE KEEP 7/17 APPT* 90 tablet 0     cyanocobalamin, vitamin B-12, 1,000 mcg Subl Place 1,000 mcg under the tongue daily.       dronedarone (FOR MULTAQ) 400 mg tablet Take 400 mg by mouth 2 (two) times a day.       olmesartan (BENICAR) 20 MG tablet Take 20 mg by mouth daily.       omeprazole (PRILOSEC) 20 MG capsule Take 1 capsule (20 mg total) by mouth daily. For the first 6 weeks, open capsule & sprinkle contents into liquids or  "onto food. 90 capsule 0     pediatric multivitamin (FLINTSTONES) Chew chewable tablet Chew 1 tablet 2 (two) times a day.       potassium chloride SA (K-DUR,KLOR-CON) 20 MEQ tablet Take 20 mEq by mouth 4 (four) times a day.        sertraline (ZOLOFT) 100 MG tablet Take 200 mg by mouth every evening.       tamsulosin (FLOMAX) 0.4 mg Cp24 Take 1 capsule (0.4 mg total) by mouth daily after supper. 90 capsule 0     ursodiol (ACTIGALL) 300 mg capsule Take 1 capsule (300 mg total) by mouth 2 (two) times a day. Start 2 wks after surgery. Do not cut, crush or open. Take with warm liquids. 180 capsule 1     No current facility-administered medications for this visit.      Objective:/72  Pulse 60  Ht 5' 11.25\" (1.81 m)  Wt 216 lb (98 kg)  BMI 29.91 kg/m2  HEENT shows no acute change.  Neck supple without adenopathy or thyromegaly.  Lungs clear.  Heart regular rate and rhythm without murmur.  Abdomen shows no masses tenderness or hepatosplenomegaly.  Has the well-healed small scars from the ports for the laparoscopic procedure.  He does have the ongoing edema in the legs bilateral with the brawny stasis changes on the right leg as chronically.  No ulcerations.  "

## 2021-06-14 NOTE — PROGRESS NOTES
"Post-op Surgical Weight Loss Diet Evaluation     Assessment:  Pt presents for 3 months post-op RD visit, s/p LSG on 8/7/2017 with Dr. Watts. Today we reviewed current eating habits and level of physical activity, and instructed on the changes that are required for successful bariatric outcomes.    Patient Progress: Pt happy with surgery results and progress so far; no questions or concerns. Pt reports plateaus - goal is 180lbs  42inch pants down to 38in     Pt's Initial Weight: 284 lbs  Weight: 209 lb (94.8 kg)  Weight loss from initial: 75  % Weight loss: 26.41 %    Body mass index is 29.15 kg/(m^2).     Concerns: Pt is drinking alcohol occasionally with wild games and coffee daily - talked about risks. Pt is also snacking out of habit in the afternoon - talked about strategies     Vitamins   Multi Vit with Iron: yes  Calcium Citrate: yes  B12: yes  D3: yes    Do you experience hunger? Yes - random   Do you have \"dumping\" syndrome? No  Nausea: no  Vomiting: no  Diarrhea: yes - 2X/week   Constipation: no  Hair loss:no    Diet Recall/Time: wakes at 415am  Breakfast: at work will have greek yogurt (12g) and banana; 2 TBS almonds (20g)   Am Snack:  Protein drink (30g)   Lunch: pea soup w/ ham OR Pro (10g) w/ 14/ cup veg   Pm snack: peanuts (7g) OR crackers   Dinner: Pro/Veg (10g)    HS Snack: SF pop sicles or 2     Typical Snacks: above     Proteins/Veg/Fruits/CHO (NOT well tolerated): pulled beef and hamburger     Estimated protein intake: 70 grams    Estimated portion size per meals: 1/4-1/2cup/meal    Incorporation of vegetables, fruits, carbohydrates into diet/meals using   Bariatric \"Plate Method\"   The patient and I discussed the importance of including lean/low fat protein at each meal, including a vegetable/fruit, and limiting carbohydrate intake to less than 25% of plate volume. Always keeping within approved perimeters of post op meal portion sizes according to 3 months post op guidelines.    Healthy Fats: " "olive oil     Meals per week away from home: 0  Recommended limiting eating out to no more than 2x/week.    Meal Duration:20 minutes  Encouraged slowing meal times down, 20-30 minutes, chewing to applesauce consistency.     Fluid-meal separation:  Fluids are not  30min before and 30 minutes after meals.  The patient and I reviewed the anatomy of the bypass and why  fluids from a meal is so important.    Fluid Intake  Water: 48oz  Caffeine: diet soda 1 every 3 weeks; coffee 1 cup/day   Alcohol: wild games light beer; 1-2 mixed drinks   Carbonation: beer occasional   Milk: none  Juice: none    Discussed the importance of adequate hydration after surgery and the goal of 64+ oz of fluid daily.   The patient understands the importance of avoiding all carbonated, caffeinated, and sweetened drinks; and instead choosing 64oz plain water.    Exercise  1-2 miles/day with the dogs  Was doing strength traing    Pt's understands that 30-60 minutes of daily activity is an important part of bariatric surgery success.   Encouraged pt to incorporate strength training exercise along with cardiovascular exercise as well, most days of the week.      PES statement:    1. (NC-1.4) Altered GI Function related to Alteration in gastrointestinal tract structure and/or function/ Decreased functional length of the GI tract as evidenced by Weight loss of 24.41% initial body weight; sleeve gastrectomy    Intervention    Discussion  1. Discussed 3 months  Post-Op Nutritional Guidelines for LSG  2. Recommended to consume 15-20gm protein at 3 meals daily, along with protein supplement/\"planned protein containing snack\" of 15-30gm protein, to reach goal of 60-80 gm protein daily.  3. Educated on post-op vitamin regimen: Multi Vit + iron 2x/day, calcium citrate 400-600 mg 2x/day, 5133-6635 mcg of Sublingual B-12 daily, and 5000 IU Vitamin D3 daily (MVI and calcium can be taken at the same time BID)  4. Reviewed lean protein " "sources  5. Bariatric Plate Method-  including lean/low fat protein at each meal, including a vegetable/fruit, and limiting carbohydrate intake to less than 25% of plate volume. Approved perimeters of post op meal portion sizes according to 3 months post op guidelines.  Instructions  1. Include 15-20gm protein at each meal, along with protein supplement/\"planned protein containing snack\" of 15-30gm protein, to reach goal of 60-80 gm protein daily.  2. Increase fluid intake to 64oz daily: choose plain or calorie/carbonation-free beverages.  3. Incorporate daily structured activity, 30-60 minutes most days of the week  4. Recommended pt to start taking: Multi Vit + iron 2x/day, calcium citrate 400-600 mg 2x/day, 4783-7173 mcg of Sublingual B-12 daily, and 5000 IU Vitamin D3 daily. (MVI and calcium can be taken at the same time)  5. Read food labels more consistently: keeping total fat grams <10, total sugar grams <10, fiber >3gm per serving.  6. Increase vegetable/fruit intake, by having a vegetable or fruit with each meal daily.  7. Practice plate method: 1/2 plate lean/low fat protein source, vegetable/fruit, <25% of plate complex carbohydrates.  8. Separate fluids 30 minutes before/after meal times.  9. Practice eating off of smaller plates/bowls, chewing to applesauce consistency, taking 20-30 minutes to eat in a calm/relaxed environment without distractions of tv/email/cell phone.    Handouts provided:  3 months  Post-Op Nutritional Guidelines for LSG    Monitor/Evaluation    Pt to follow up for 6 months  post-op visit with bariatrician     Time In: 12:30p  Time Out: 1:00p    ABN signed: Yes      "

## 2021-06-15 NOTE — PROGRESS NOTES
Assessment:  1.  Major depression in partial remission, recent flare.  2.  Hypertension, not at goal.  3.  Underlying coronary artery disease.  4.  Recent ablation for atrial fibrillation.    Plan: Recommend he do light therapy for one half hour to 45 minutes every day since his wife already has that light machine for her seasonal affective disorder, start short-term counseling, then try increase in sertraline dose from the current 200 mg per day up to 250 mg nightly.  Prescribed sertraline 100 mg-#225-refill ×1-2-1/2 tablets each night at bedtime.  Follow-up to see me in 6 weeks but earlier as needed.  Plan on rechecking blood pressure at that time.  Elected to not increase medication for that today given the recent hospitalization and the cardiologist plan etc.  On return he will be fasting for recheck labs.    Subjective: 62-year-old male presenting for follow-up on the above.  He notes that both he and his wife feel that his depression has flared and he is gotten angrier easily lately and just not feeling as good.  PHQ-9 score today is 7.  He does not have any history of seasonal affective disorder but he notes that his wife does have that.  She does have a light machine.  He has not tried using that.  He has never had any counseling for his depression.  Regarding the heart he had the recent ablation therapy at [facility] and was discharged on January 26.  When he finishes his Pradaxa 150 mg twice daily then he will be going back to the Eliquis medication.  He is on the omeprazole 40 mg daily until his pills are gone which he thinks will be within the next week or so.  Past Medical History:   Diagnosis Date     Allergic rhinitis      Atrial fibrillation      Chronic back pain      Coronary artery disease     1999-no stents, no significant damage. Echo 7/2016, EKG 1/2017     Degenerative joint disease (DJD) of lumbar spine     traumatic fall L1-L3     Depression      Hard of hearing      Hypercholesterolemia       Hyperparathyroidism      Hypertension      Low zinc level      Lower leg edema      Metabolic syndrome      Rosacea      Sleep apnea     CPAP     Type 2 diabetes mellitus     A1c 6.9 3/2017     Allergies   Allergen Reactions     Amlodipine Other (See Comments)     ankle swelling       Benazepril Cough     Cat Dander      Clonidine Other (See Comments) and Dizziness     mood changes    mood changes       Mold Itching     nasal     Penicillins Other (See Comments)     Unknown childhood reaction     Current Outpatient Prescriptions   Medication Sig Dispense Refill     atorvastatin (LIPITOR) 80 MG tablet TAKE ONE TABLET BY MOUTH AT BEDTIME 90 tablet 1     calcium carbonate-vitamin D3 (CALTRATE 600 PLUS D3) 600 mg(1,500mg) -400 unit per tablet Take 1 tablet by mouth daily.       CARTIA  mg 24 hr capsule TAKE ONE CAPSULE BY MOUTH TWICE A  capsule 0     cholecalciferol, vitamin D3, 5,000 unit Tab Take 5,000 Units by mouth bedtime. Take as directed.       clopidogrel (PLAVIX) 75 mg tablet TAKE ONE TABLET BY MOUTH EVERY DAY *PLEASE KEEP 7/17 APPT* 90 tablet 0     cyanocobalamin, vitamin B-12, 1,000 mcg Subl Place 1,000 mcg under the tongue daily.       dabigatran etexilate (PRADAXA) 150 mg capsu Take 150 mg by mouth.       olmesartan (BENICAR) 20 MG tablet Take 20 mg by mouth daily.       omeprazole (PRILOSEC) 40 MG capsule Take 40 mg by mouth.       pediatric multivitamin (FLINTSTONES) Chew chewable tablet Chew 1 tablet 2 (two) times a day.       potassium chloride SA (K-DUR,KLOR-CON) 20 MEQ tablet Take 20 mEq by mouth 4 (four) times a day.        sertraline (ZOLOFT) 100 MG tablet Take 2.5 tablets (250 mg total) by mouth every evening. 225 tablet 1     tamsulosin (FLOMAX) 0.4 mg Cp24 Take 1 capsule (0.4 mg total) by mouth daily after supper. 90 capsule 0     ursodiol (ACTIGALL) 300 mg capsule Take 1 capsule (300 mg total) by mouth 2 (two) times a day. Start 2 wks after surgery. Do not cut, crush or open.  Take with warm liquids. 180 capsule 1     apixaban (ELIQUIS) 5 mg Tab tablet Take 5 mg by mouth 2 (two) times a day.       dronedarone (FOR MULTAQ) 400 mg tablet Take 400 mg by mouth 2 (two) times a day.       No current facility-administered medications for this visit.      Note that the above listed medication is after the increase in the dose of the sertraline today.  All other review of systems is negative today.    Objective:/86 (Patient Site: Right Arm, Patient Position: Sitting, Cuff Size: Adult Large)  Pulse 70  Wt 210 lb (95.3 kg)  BMI 29.29 kg/m2  HEENT examination shows no acute change.  Neck supple without adenopathy or thyromegaly.  Lungs clear.  Heart regular rate and rhythm without murmur.  Abdomen shows no masses tenderness or hepatosplenomegaly.  No pedal edema.  Is alert with clear speech.  No suicidal ideation.  He interacts well and makes good eye contact.

## 2021-06-16 PROBLEM — R35.1 BENIGN PROSTATIC HYPERPLASIA WITH NOCTURIA: Status: ACTIVE | Noted: 2017-08-09

## 2021-06-16 PROBLEM — N40.1 BENIGN PROSTATIC HYPERPLASIA WITH NOCTURIA: Status: ACTIVE | Noted: 2017-08-09

## 2021-06-16 PROBLEM — E66.3 OVERWEIGHT: Status: ACTIVE | Noted: 2020-01-20

## 2021-06-16 NOTE — PROGRESS NOTES
Assessment:  1.  Non-insulin-dependent diabetes mellitus, checking status.  2.  Hypertension, not at goal.  3.  Hyperlipidemia, checking status.  4.  Coronary artery disease, asymptomatic.  5.  Depression, in remission on current regimen.  6.  Recent significant situational stress.    Plan: Recommend he start Spironolactone 25 mg-1 p.o. daily-#90 sent.  Recommend he reduce his potassium dose to 1 pill twice a day from the 4 pills per day level and prescription for #180 sent to his pharmacy.  Follow-up in 1 month for recheck on the blood pressure etc. but earlier as needed.  Continue diet and exercise efforts.  Continue other current medication.  Empathized regarding the various stresses.    Subjective: 62-year-old male presenting for follow-up on the above.  Regarding diabetes his weight is been significantly down since the bypass surgery.  His weight is been stable here since last check.  Regarding hypertension he brings in blood pressure results and all of his readings at home are in the 130-1 and 140 systolic type range.  No headaches or dizziness or leg swelling.  He is fasting.  Regarding heart no trouble with chest pain or trouble breathing or leg swelling.  His PHQ-9 score today is 4.  His MAYKEL-7 score is 5.  He notes that his wife had a femur fracture recently and had to have surgery to place a alexx in the femur.  She is at home and using walker.  He notes that his 30+-year-old daughter with history of chemical dependency had relapse recently.  Past Medical History:   Diagnosis Date     Allergic rhinitis      Atrial fibrillation      Chronic back pain      Coronary artery disease     1999-no stents, no significant damage. Echo 7/2016, EKG 1/2017     Degenerative joint disease (DJD) of lumbar spine     traumatic fall L1-L3     Depression      Hard of hearing      Hypercholesterolemia      Hyperparathyroidism      Hypertension      Low zinc level      Lower leg edema      Metabolic syndrome      Rosacea       Sleep apnea     CPAP     Type 2 diabetes mellitus     A1c 6.9 3/2017     Allergies   Allergen Reactions     Amlodipine Other (See Comments)     ankle swelling       Benazepril Cough     Cat Dander      Clonidine Other (See Comments) and Dizziness     mood changes    mood changes       Mold Itching     nasal     Penicillins Other (See Comments)     Unknown childhood reaction     Current Outpatient Prescriptions   Medication Sig Dispense Refill     apixaban (ELIQUIS) 5 mg Tab tablet Take 5 mg by mouth 2 (two) times a day.       atorvastatin (LIPITOR) 80 MG tablet TAKE ONE TABLET BY MOUTH AT BEDTIME 90 tablet 1     calcium citrate-vitamin D3 250 mg calcium- 200 unit Tab Take 1 tablet by mouth 2 (two) times a day.       CARTIA  mg 24 hr capsule TAKE ONE CAPSULE BY MOUTH TWICE A  capsule 0     cholecalciferol, vitamin D3, 5,000 unit Tab Take 5,000 Units by mouth bedtime. Take as directed.       clopidogrel (PLAVIX) 75 mg tablet TAKE ONE TABLET BY MOUTH EVERY DAY *PLEASE KEEP 7/17 APPT* 90 tablet 0     cyanocobalamin, vitamin B-12, 1,000 mcg Subl Place 1,000 mcg under the tongue daily.       dronedarone (FOR MULTAQ) 400 mg tablet Take 400 mg by mouth 2 (two) times a day.       olmesartan (BENICAR) 20 MG tablet Take 20 mg by mouth daily.       pediatric multivitamin (FLINTSTONES) Chew chewable tablet Chew 1 tablet 2 (two) times a day.       potassium chloride SA (K-DUR,KLOR-CON) 20 MEQ tablet Take 1 tablet (20 mEq total) by mouth 2 (two) times a day. 180 tablet 0     sertraline (ZOLOFT) 100 MG tablet Take 2.5 tablets (250 mg total) by mouth every evening. 225 tablet 1     tamsulosin (FLOMAX) 0.4 mg Cp24 TAKE 1 CAPSULE (0.4 MG TOTAL) BY MOUTH DAILY AFTER SUPPER. 90 capsule 0     dabigatran etexilate (PRADAXA) 150 mg capsu Take 150 mg by mouth.       spironolactone (ALDACTONE) 25 MG tablet Take 1 tablet (25 mg total) by mouth daily. 90 tablet 0     No current facility-administered medications for this visit.       Note that the above listed medication is after the addition of the Spironolactone today and the reduction in the dose of the potassium.  All other review of systems negative.    Objective:/82 (Patient Site: Left Arm, Patient Position: Sitting, Cuff Size: Adult Large)  Pulse 72  Wt 209 lb (94.8 kg)  BMI 29.15 kg/m2  HEENT exam shows no acute change.  Neck supple without adenopathy or thyromegaly.  Lungs clear.  Heart regular rate and rhythm without murmur.  Abdomen shows no masses tenderness or hepatosplenomegaly.  No pedal edema.  Is alert with clear speech.

## 2021-06-17 NOTE — PATIENT INSTRUCTIONS - HE
"Patient Instructions by Giovani Duff DO at 6/19/2019  9:00 AM     Author: Giovani Duff DO Service: -- Author Type: Physician    Filed: 6/19/2019  9:08 AM Encounter Date: 6/19/2019 Status: Signed    : Giovani Duff DO (Physician)         SLEEP HYGIENE    Sleep only as much as you need to feel rested and then get out of bed   Keep a regular sleep schedule   Avoid forcing sleep   Exercise regularly for at least 20 minutes, preferably 4 to 5 hours before bedtime   Avoid caffeinated beverages after lunch   Avoid alcohol near bedtime: no \"night cap\"   Avoid smoking, especially in the evening   Do not go to bed hungry   Adjust bedroom environment   Avoid prolonged use of light-emitting screens before bedtime    Deal with your worries before bedtime              "

## 2021-06-17 NOTE — PROGRESS NOTES
Assessment:  1.  Hypertension, controlled.  2.  Insomnia.    Plan: Check basic profile because of recently starting the spironolactone as well as checking potassium level.  After discussion, we mutually decided to have him start trial of trazodone 50 mg-1 to 2 at bedtime as needed for sleep as #60, refillable ×1.  If the potassium level on the current blood is in the mid to upper normal range, would have him try stopping the supplemental potassium altogether since he is on the spironolactone.  And if that is done and he can recheck the basic profile in 1 month, lab only is okay, follow-up if any problems, otherwise plan to see me in 4 months.  Continue his excellent exercise and diet efforts.    Subjective: 62-year-old male here for follow-up on the hypertension but also noting difficulty with sleeping and would like to try medication.  He started the spironolactone, did reduce the potassium to the 2 pills per day, and generally has been feeling fine.  He does note that he has some difficulty with sleeping.  It is enough that he would like to consider taking a sleeping pill.  Although he tends to get to sleep okay, he notes that he wakes up every hour to hour and a half.  He is not up for a long time but he feels like he just does not get a good restful sleep.  He does use a CPAP on a regular basis, and although some people have said that that really helps his sleep, he feels it does not help his sleep to be using the CPAP.  Past Medical History:   Diagnosis Date     Allergic rhinitis      Atrial fibrillation      Chronic back pain      Coronary artery disease     1999-no stents, no significant damage. Echo 7/2016, EKG 1/2017     Degenerative joint disease (DJD) of lumbar spine     traumatic fall L1-L3     Depression      Hard of hearing      Hypercholesterolemia      Hyperparathyroidism      Hypertension      Low zinc level      Lower leg edema      Metabolic syndrome      Rosacea      Sleep apnea     CPAP     Type  2 diabetes mellitus     A1c 6.9 3/2017     Allergies   Allergen Reactions     Amlodipine Other (See Comments)     ankle swelling       Benazepril Cough     Cat Dander      Clonidine Other (See Comments) and Dizziness     mood changes    mood changes       Mold Itching     nasal     Penicillins Other (See Comments)     Unknown childhood reaction     Current Outpatient Prescriptions   Medication Sig Dispense Refill     apixaban (ELIQUIS) 5 mg Tab tablet Take 5 mg by mouth 2 (two) times a day.       atorvastatin (LIPITOR) 80 MG tablet TAKE ONE TABLET BY MOUTH AT BEDTIME 90 tablet 1     calcium citrate-vitamin D3 250 mg calcium- 200 unit Tab Take 1 tablet by mouth 2 (two) times a day.       CARTIA  mg 24 hr capsule TAKE ONE CAPSULE BY MOUTH TWICE A  capsule 3     cholecalciferol, vitamin D3, 5,000 unit Tab Take 5,000 Units by mouth bedtime. Take as directed.       clopidogrel (PLAVIX) 75 mg tablet TAKE ONE TABLET BY MOUTH EVERY DAY *PLEASE KEEP 7/17 APPT* 90 tablet 0     cyanocobalamin, vitamin B-12, 1,000 mcg Subl Place 1,000 mcg under the tongue daily.       olmesartan (BENICAR) 20 MG tablet Take 20 mg by mouth daily.       pediatric multivitamin (FLINTSTONES) Chew chewable tablet Chew 1 tablet 2 (two) times a day.       potassium chloride SA (K-DUR,KLOR-CON) 20 MEQ tablet Take 1 tablet (20 mEq total) by mouth 2 (two) times a day. 180 tablet 0     sertraline (ZOLOFT) 100 MG tablet Take 2.5 tablets (250 mg total) by mouth every evening. 225 tablet 1     spironolactone (ALDACTONE) 25 MG tablet Take 1 tablet (25 mg total) by mouth daily. 90 tablet 0     tamsulosin (FLOMAX) 0.4 mg Cp24 TAKE 1 CAPSULE (0.4 MG TOTAL) BY MOUTH DAILY AFTER SUPPER. 90 capsule 0     dabigatran etexilate (PRADAXA) 150 mg capsu Take 150 mg by mouth.       dronedarone (FOR MULTAQ) 400 mg tablet Take 400 mg by mouth 2 (two) times a day.       traZODone (DESYREL) 50 MG tablet Take 1 tablet (50 mg total) by mouth at bedtime. Take 1-2  "tablets orally at bedtime for insomnia 60 tablet 1     No current facility-administered medications for this visit.      Objective:/80  Pulse 69  Resp 14  Ht 5' 11\" (1.803 m)  Wt 213 lb (96.6 kg)  SpO2 97%  BMI 29.71 kg/m2  HEENT is negative.  TMs are normal.  Neck supple without adenopathy.  Lungs clear.  Heart regular rate and rhythm without murmur.  No pedal edema.  Is alert with clear speech.  "

## 2021-06-17 NOTE — PATIENT INSTRUCTIONS - HE
Patient Instructions by Yoko Nowak MD at 2/7/2019 10:30 AM     Author: Yoko Nowak MD Service: -- Author Type: Physician    Filed: 2/7/2019 11:41 AM Encounter Date: 2/7/2019 Status: Signed    : Yoko Nowak MD (Physician)             [facility]  Nutritional Guidelines  Gastric Sleeve 18 Months Post Op and Beyond    General Guidelines and Helpful Hints:    Eat 3 meals per day + protein supplement(s). No snacks between meals.  o Do not skip meals.  This can cause overeating at the next meal and will prevent adequate protein and nutritional intake.    Aim for 60-80 grams of protein per day.  o Always eat your protein first. This assists with optimal nutrition and helps you stay full longer.  o Depending on your portion size, you may need to drink approved protein supplement between meals to achieve protein goals. Follow recommendations of your Dietitian.     Eat your protein first, and then follow with fiber.   o It is not necessary to count your fiber, but 15-20 grams per day is recommended.    o Add fiber by including fruits, vegetables, whole grains, and beans.     Portions should remain about 1 cup per meal. Use measuring cups to be accurate.    Continue to use saucer/salad plates, infant/toddler silverware to keep portion sizes small and take small bites.    Eat S-L-O-W-L-Y to make each meal last 20-30 minutes. Always stop eating when satisfied.    Continue to use caution with foods containing skins, peels or membranes. Chew well!    Aim for 64 oz. of calorie-free fluids daily.  o Continue to avoid caffeine and carbonation. If you choose to drink alcohol, do so in moderation.   o Remember to avoid drinking during meals, 15-30 minutes before and 30 minutes after.    Exercise is ahuja for continued weight loss and weight maintenance. Aim for 30-60 minutes of physical activity most days of the week. Include cardiovascular and strength training.    If having trouble  tolerating meat, try using a crock-pot, tinfoil tent, steamer or other moist cooking method to create tender meats. Add broth or low-fat gravy to help meat stay moist.     Avoid high sugar and high fat foods to prevent high calorie intakes and a reduced rate of weight loss, or weight regain.  o Check nutrition labels for less than 10 grams of sugar and less than 10 grams of fat per serving.    Continue Taking Vitamins/Minerals:  o 6674-9073 mcg of Sublingual B-12 daily  o 1 Multivitamin with Iron twice daily (chewable or swallow tabs)  o 500-600 mg Calcium Citrate twice daily (chewable or swallow tabs)  o 5000 IU Vitamin D3 daily    Sample Grocery List    Protein:    Fat free Greek or light yogurt (less than 10 grams sugar)    Fat free or low-fat cottage cheese    String cheese or reduced fat cheese slices    Tuna, salmon, crab, egg, or chicken salad made with light or fat free mayonnaise    Egg or Egg Substitute    Lean/extra lean turkey, beef, bison, venison (ground, sirloin, round, flank)    Pork loin or tenderloin (grilled, baked, broiled)    Fish such as salmon, tuna, trout, tilapia, etc. (grilled, baked, broiled)    Tender cuts of lean (skinless) turkey or chicken    Lean deli meats: turkey, lean ham, chicken, lean roast beef    Beans such as kidney, garbanzo, black, retana, or low-fat/fat free refried beans    Peanut butter (natural preferred). Limit to 1 Tbsp. per day.    Low-fat meatloaf (made with lean ground beef or turkey)    Sloppy Joes made with low-sugar ketchup and lean ground beef or turkey    Soy or vegetable protein (i.e. vegan crumbles, soy/veggie burger, tofu)    Hummus    Vegetables:    Fresh: cooked or raw (as tolerated)    Frozen vegetables    Canned vegetables (low sodium or no salt added, rinse before cooking/eating)    (Ok to have skins/peels/membranes/seeds - just chew well)    Fruits:    Fresh fruit    Frozen fruit (no sugar added)    Canned fruit (packed in its own juice, NOT  syrup)    (Ok to have skins/peels/membranes/seeds - just chew well)    Starch:    Unsweetened whole-grain hot cereal (or high fiber cold cereal, dry)    Toasted whole wheat bread or Angela Thins    Whole grain crackers    Baked /boiled/mashed potato/sweet potato    Cooked whole grain pasta, brown rice, or other cooked whole grains    Starchy vegetables: corn, peas, winter squash    Protein Supplement:     Ready to drink protein shake with:  o 15-30 grams protein per serving  o Less than 10 grams total carbohydrate per serving     Protein powder mixed with:  o  Skim or 1% milk  o Low fat or fat free Lactaid milk, plain or no sugar added soymilk  o Water     Fats: (use in moderation)    1 teaspoon of soft tub margarine    1 teaspoon olive oil, canola oil, or peanut oil    1 tablespoon of low-fat cole or salad dressing     Sample Menu for 18+ months after Gastric Sleeve    You do NOT need to eat/drink the full portion sizes listed below  Always stop when you are satisfied    Breakfast   cup 1% cottage cheese     cup mixed berries   Lunch 2 oz lean roast beef on   Angela Thin with 1 tsp. light cole    small tomato, chopped, mixed with 1 tsp. light vinaigrette dressing   Supplement Approved protein supplement (if needed between meals)   Dinner 2 oz grilled salmon    cup salad greens with 1 tsp. light salad dressing and 1 tsp. ground flax seed    cup quinoa or brown rice     Breakfast   cup egg substitute with   cup sautéed chopped vegetables  2 light [place] Krisp crackers   Lunch Tuna Melt:   cup tuna mixed with 1 tsp. light cole over   Angela Thin. Top with 2-3 slices cucumber and 1 oz slice of low fat cheese   Supplement 1 cup skim milk (if needed between meals)   Dinner 3 oz  grilled, broiled, or baked seasoned skinless chicken breast    cup asparagus     Breakfast   cup plain oatmeal made with skim or 1% milk with 1 Tbsp. flavored/unflavored protein powder added  1 mozzarella string cheese   Lunch 2 oz deli turkey  breast  1/3 cup salad with 1 tsp. light salad dressing, 1/8 of a whole avocado and 1 Tbsp. sunflower seeds   Dinner 3 oz. pork loin made in a crock pot, seasoned with a spice rub    cup cooked carrots   Supplement Approved protein supplement (if needed between meals)     Breakfast 1 cup breakfast casserole made with egg substitute, turkey sausage,  and steamed, chopped bell peppers   Supplement  1 cup light Greek yogurt (if needed between meals)   Lunch 2 oz. teriyaki turkey    cup mashed sweet potato with 1-2 spritzes of spray butter (like [place])    cup fresh pineapple   Dinner 3 oz low fat meatloaf    cup roasted garlic zucchini     Breakfast   cup leftover breakfast casserole    cup no sugar added applesauce with 1 Tbsp. unflavored protein powder and a sprinkle of cinnamon    Lunch 3 oz shrimp with 1-2 Tbsp. low-sugar cocktail sauce for dipping    c. whole wheat pasta drizzled with   tsp. olive oil   Supplement 1 cup skim/1% milk with scoop of protein powder (if needed between meals)   Dinner Grilled, seasoned kebob with 2 oz lean beef and   cup vegetables     Breakfast Breakfast pizza:   Arlee Thin spread with 1 Tbsp. low sugar spaghetti sauce,   cup shredded low fat cheese, melted and 1 slice of Pakistani fay     cup fresh fruit mixed with chopped almonds   Lunch   cup black bean soup  4-5 whole grain crackers   Dinner 3 oz  tilapia with lemon pepper seasoning    cup stewed tomatoes   Supplement 1 string cheese (if needed between meals)     Breakfast 2 hard boiled eggs (discard 1 egg yolk)    whole wheat English Muffin with 1 tsp. low sugar jelly   Lunch   cup leftover black bean soup topped with 1-2 Tbsp. low fat cheese  2-3 light [place] Krisp crackers   Supplement Approved protein supplement (if needed between meals)   Dinner 3 oz sirloin steak    cup steamed broccoli

## 2021-06-18 NOTE — PROGRESS NOTES
Assessment:  1.  Hypertension, controlled.  2.  Insomnia controlled.    Plan: Check basic profile.  The potassium remains normal, and certainly can stay off potassium long-term.  At minimum follow-up in September for recheck diabetes visit etc.  Call or return earlier as needed.  Refills done and trazodone 50 mg-2 p.o. nightly #60 refillable ×3.    Subjective: 62-year-old male presenting for follow-up on hypertension and he has gone off the potassium altogether since the last visit.  He notes that he does sleep best when he takes 2 of the trazodone at night and so requests refill on the trazodone at that dose.  He notes no trouble with side effects from the trazodone.  He wakes up feeling refreshed.  Regarding hypertension no headaches or dizziness or leg swelling.    Patient Active Problem List   Diagnosis     ELINOR on CPAP     Onychomycosis     Rosacea     Allergic Rhinitis     Recurrent major depression in partial remission     Essential Hypercholesterolemia     Essential hypertension     Vitamin D Deficiency     Calcaneal spur of right foot     Obesity     Degenerative joint disease (DJD) of lumbar spine     Chronic back pain     Lower leg edema     Coronary artery disease     Hard of hearing     Hyperparathyroidism     Low zinc level     Diabetes mellitus     Paroxysmal atrial fibrillation     Benign prostatic hyperplasia with nocturia       Past Medical History:   Diagnosis Date     Allergic rhinitis      Atrial fibrillation      Chronic back pain      Coronary artery disease     1999-no stents, no significant damage. Echo 7/2016, EKG 1/2017     Degenerative joint disease (DJD) of lumbar spine     traumatic fall L1-L3     Depression      Hard of hearing      Hypercholesterolemia      Hyperparathyroidism      Hypertension      Low zinc level      Lower leg edema      Metabolic syndrome      Rosacea      Sleep apnea     CPAP     Type 2 diabetes mellitus     A1c 6.9 3/2017     Allergies   Allergen Reactions      Amlodipine Other (See Comments)     ankle swelling       Benazepril Cough     Cat Dander      Clonidine Other (See Comments) and Dizziness     mood changes    mood changes       Mold Itching     nasal     Penicillins Other (See Comments)     Unknown childhood reaction     Current Outpatient Prescriptions   Medication Sig Dispense Refill     apixaban (ELIQUIS) 5 mg Tab tablet Take 5 mg by mouth 2 (two) times a day.       atorvastatin (LIPITOR) 80 MG tablet TAKE ONE TABLET BY MOUTH AT BEDTIME 90 tablet 1     calcium citrate-vitamin D3 250 mg calcium- 200 unit Tab Take 1 tablet by mouth 2 (two) times a day.       CARTIA  mg 24 hr capsule TAKE ONE CAPSULE BY MOUTH TWICE A  capsule 3     cholecalciferol, vitamin D3, 5,000 unit Tab Take 5,000 Units by mouth bedtime. Take as directed.       clopidogrel (PLAVIX) 75 mg tablet TAKE ONE TABLET BY MOUTH EVERY DAY *PLEASE KEEP 7/17 APPT* 90 tablet 0     cyanocobalamin, vitamin B-12, 1,000 mcg Subl Place 1,000 mcg under the tongue daily.       olmesartan (BENICAR) 20 MG tablet Take 20 mg by mouth daily.       pediatric multivitamin (FLINTSTONES) Chew chewable tablet Chew 1 tablet 2 (two) times a day.       sertraline (ZOLOFT) 100 MG tablet Take 2.5 tablets (250 mg total) by mouth every evening. 225 tablet 1     spironolactone (ALDACTONE) 25 MG tablet Take 1 tablet (25 mg total) by mouth daily. 90 tablet 0     tamsulosin (FLOMAX) 0.4 mg Cp24 TAKE 1 CAPSULE (0.4 MG TOTAL) BY MOUTH DAILY AFTER SUPPER. 90 capsule 0     traZODone (DESYREL) 50 MG tablet Take 2 tablets (100 mg total) by mouth at bedtime. 60 tablet 3     dabigatran etexilate (PRADAXA) 150 mg capsu Take 150 mg by mouth.       No current facility-administered medications for this visit.      Note that the above trazodone is after today's prescription.  All other review of systems are negative.    Objective:/70  Pulse 74  Resp 12  Wt 211 lb (95.7 kg)  SpO2 97%  BMI 29.43 kg/m2  HEENT is negative.   Neck supple without adenopathy or thyromegaly.  Lungs clear.  Heart regular rate and rhythm without murmur.  Abdomen shows no masses tenderness or hepatosplenomegaly.  At most trace edema at the ankles.  Alert with clear speech.

## 2021-06-18 NOTE — PROGRESS NOTES
"Post-op Surgical Weight Loss Diet Evaluation     Assessment:  Pt presents for 9-month post-op RD visit, s/p LSG on 8/7/2017 with Dr. Watts. Today we reviewed current eating habits and level of physical activity, and instructed on the changes that are required for successful bariatric outcomes.    +pt significantly late to appt (limited time for education)  Patient Progress: Pt hit a plateau but noted snacking increased - discussed and rec seeing delmer for a follow-up (will consider)   +pt is happy with weight loss results thus far but would like to improve his strength (discussed the other benefits of strength training as well)   -wife yaritza femur been taking care of her - lowered his activity level     Pt's Initial Weight: 284 lbs  Weight: 208 lb (94.3 kg)  Weight loss from initial: 76  % Weight loss: 26.76 %    Body mass index is 29.01 kg/(m^2).     Concerns: Pt drinking diet coke daily, limited with physical activity and states he has been snacking more frequently      Vitamins   Multi Vit with Iron: yes  Calcium Citrate: yes  B12: yes  D3: yes    Do you experience hunger? Yes  Do you have \"dumping\" syndrome? No  Do you experience any reflux or discomfort with eating? No  Nausea: no  Vomiting: no  Diarrhea: yes  Constipation: no  Hair loss:no    Diet Recall/Time:   Breakfast: Greek yogurt with fruit and cheese and coffee (20g)   Am Snack: premier protein (30g)   Lunch: Pro/Veg/CHO (21g)   Pm snack: peanuts OR seeds  Dinner: Pro/Veg/CHO (21g)   HS Snack: occasional cracker and cheese    Typical Snacks: above     Proteins/Veg/Fruits/CHO (NOT well tolerated): fried foods     Estimated protein intake:  grams    Estimated portion size per meals:1 cup/meal    Incorporation of vegetables, fruits, carbohydrates into diet/meals using   Bariatric \"Plate Method\"   The patient and I discussed the importance of including lean/low fat protein at each meal, including a vegetable/fruit, and limiting carbohydrate intake " "to less than 25% of plate volume. Always keeping within approved perimeters of post op meal portion sizes according to 9 months post op guidelines.    Healthy Fats: olive oil     Meals per week away from home: 2X/month  Recommended limiting eating out to no more than 2x/week.    Meal Duration:15 minutes  Encouraged slowing meal times down, 20-30 minutes, chewing to applesauce consistency.     Fluid-meal separation:  Fluids are  30min before and 30 minutes after meals.  The patient and I reviewed the anatomy of the bypass and why  fluids from a meal is so important.    Fluid Intake  Water: 64oz  Caffeine: 1cup/day   Alcohol: none  Carbonation: 1 can diet soda  Milk: none  Juice: none    Discussed the importance of adequate hydration after surgery and the goal of 64+ oz of fluid daily.   The patient understands the importance of avoiding all carbonated, caffeinated, and sweetened drinks; and instead choosing 64oz plain water.    Exercise  ADL   Will walk daily with dogs 1-2miles     Pt's understands that 30-60 minutes of daily activity is an important part of bariatric surgery success.   Encouraged pt to incorporate strength training exercise along with cardiovascular exercise as well, most days of the week.      PES statement:    1. (NC-1.4) Altered GI Function related to Alteration in gastrointestinal tract structure and/or function/ Decreased functional length of the GI tract as evidenced by Weight loss of 26.76% initial body weight; sleeve gastrectomy    Intervention    Discussion  1. Discussed 9-month  Post-Op Nutritional Guidelines for LSG  2. Recommended to consume 15-20gm protein at 3 meals daily, along with protein supplement/\"planned protein containing snack\" of 15-30gm protein, to reach goal of 60-80 gm protein daily.  3. Educated on post-op vitamin regimen: Multi Vit + iron 2x/day, calcium citrate 400-600 mg 2x/day, 3484-5083 mcg of Sublingual B-12 daily, and 5000 IU Vitamin D3 daily " "(MVI and calcium can be taken at the same time BID)  4. Reviewed lean protein sources  5. Bariatric Plate Method-  including lean/low fat protein at each meal, including a vegetable/fruit, and limiting carbohydrate intake to less than 25% of plate volume. Approved perimeters of post op meal portion sizes according to 3/6/9/12 months post op guidelines.  Instructions  1. Include 15-20gm protein at each meal, along with protein supplement/\"planned protein containing snack\" of 15-30gm protein, to reach goal of 60-80 gm protein daily.  2. Increase fluid intake to 64oz daily: choose plain or calorie/carbonation-free beverages.  3. Incorporate daily structured activity, 30-60 minutes most days of the week  4. Recommended pt to start taking: Multi Vit + iron 2x/day, calcium citrate 400-600 mg 2x/day, 4661-0386 mcg of Sublingual B-12 daily, and 5000 IU Vitamin D3 daily. (MVI and calcium can be taken at the same time)  5. Read food labels more consistently: keeping total fat grams <10, total sugar grams <10, fiber >3gm per serving.  6. Increase vegetable/fruit intake, by having a vegetable or fruit with each meal daily.  7. Practice plate method: 1/2 plate lean/low fat protein source, vegetable/fruit, <25% of plate complex carbohydrates.  8. Separate fluids 30 minutes before/after meal times.  9. Practice eating off of smaller plates/bowls, chewing to applesauce consistency, taking 20-30 minutes to eat in a calm/relaxed environment without distractions of tv/email/cell phone.    Handouts provided:  9-month  Post-Op Nutritional Guidelines for LSG    Monitor/Evaluation    Pt to follow up for 1 year  post-op visit with bariatrician - appt scheduled      Time In: 2:15p  Time Out: 2:30p      ABN signed: Yes      "

## 2021-06-19 NOTE — PROGRESS NOTES
12 mos post op lab orders placed for patient in preparation for appointment with  in August.    Bertha Coronado RN, N  [facility]  P [phone]  F [phone]

## 2021-06-20 NOTE — PROGRESS NOTES
Assessment:  1.  Non-insulin-dependent diabetes mellitus, well controlled.  2.  Coronary artery disease, asymptomatic.  3.  Hypertension controlled.  4.  Hyperlipidemia, checking status.  5.  Depression well controlled, insomnia controlled.  6.  Need for immunization.    Plan: Refilled trazodone 50 mg-#60-2 p.o. nightly, refillable ×3.  Check fasting lipid hepatic and basic profiles.  Continue excellent diet and exercise efforts.  Follow-up in 4 months for next med check or earlier as needed.  He will ask the sleep clinic to get us a copy of the report of his recent test.  Give Prevnar, and influenza immunization.    Subjective: 63-year-old male presenting for follow-up on the above.  Regarding diabetes, he did have A1c done on August 8 as part of follow-up of the bariatric surgery and level was excellent at 5.9%.  He is not currently checking blood sugars but his wife does have a glucometer so he can check if he ever wanted to.  Regarding the heart he denies any chest pain trouble breathing or leg swelling.  Regarding hypertension no dizziness or leg swelling and no change in his pattern of occasional headaches that he has had long-term.  Regarding lipids no diarrhea constipation muscle aching or muscle weakness.  PHQ-9 score is 3 and MAYKEL-7 score is 4.  Past Medical History:   Diagnosis Date     Allergic rhinitis      Atrial fibrillation (H)      Chronic back pain      Coronary artery disease     1999-no stents, no significant damage. Echo 7/2016, EKG 1/2017     Degenerative joint disease (DJD) of lumbar spine     traumatic fall L1-L3     Depression      Hard of hearing      Hypercholesterolemia      Hyperparathyroidism (H)      Hypertension      Low zinc level      Lower leg edema      Metabolic syndrome      Rosacea      Sleep apnea     CPAP     Type 2 diabetes mellitus (H)     A1c 6.9 3/2017     Allergies   Allergen Reactions     Amlodipine Other (See Comments)     ankle swelling       Benazepril Cough     Cat  Dander      Clonidine Other (See Comments) and Dizziness     mood changes    mood changes       Mold Itching     nasal     Penicillins Other (See Comments)     Unknown childhood reaction     Current Outpatient Prescriptions   Medication Sig Dispense Refill     apixaban (ELIQUIS) 5 mg Tab tablet Take 5 mg by mouth 2 (two) times a day.       aspirin 81 MG EC tablet Take 81 mg by mouth.       atorvastatin (LIPITOR) 80 MG tablet TAKE ONE TABLET BY MOUTH AT BEDTIME 90 tablet 2     calcium citrate-vitamin D3 250 mg calcium- 200 unit Tab Take 1 tablet by mouth 2 (two) times a day.       CARTIA  mg 24 hr capsule TAKE ONE CAPSULE BY MOUTH TWICE A  capsule 3     cholecalciferol, vitamin D3, 5,000 unit Tab Take 5,000 Units by mouth bedtime. Take as directed.       cyanocobalamin, vitamin B-12, 1,000 mcg Subl Place 1,000 mcg under the tongue daily.       multivitamin with iron (ONE DAILY WITH IRON) Tab tablet Take 1 tablet by mouth 2 (two) times a day.       olmesartan (BENICAR) 20 MG tablet Take 20 mg by mouth daily.       sertraline (ZOLOFT) 100 MG tablet TAKE 2.5 TABLETS (250MG) EACH EVENING 225 tablet 2     spironolactone (ALDACTONE) 25 MG tablet TAKE 1 TABLET (25 MG TOTAL) BY MOUTH DAILY. 90 tablet 3     tamsulosin (FLOMAX) 0.4 mg Cp24 TAKE 1 CAPSULE (0.4 MG TOTAL) BY MOUTH DAILY AFTER SUPPER. 90 capsule 3     traZODone (DESYREL) 50 MG tablet Take 2 tablets (100 mg total) by mouth at bedtime. 60 tablet 3     zinc gluconate 50 mg tablet Take 1 tablet (50 mg total) by mouth daily. 90 tablet 1     dabigatran etexilate (PRADAXA) 150 mg capsu Take 150 mg by mouth.       omeprazole (PRILOSEC) 20 MG capsule Take 1 capsule (20 mg total) by mouth daily. 90 capsule prn     No current facility-administered medications for this visit.      All other review of systems are negative for any other changes.  He notes he is trying to pursue getting the Inspire device and because [facility] did not handle that, he is going  through the ear nose and throat group and had a sleep test done and goes in for his results later this week.    Objective:/72  Pulse 70  Wt 211 lb (95.7 kg)  SpO2 97%  BMI 29.43 kg/m2  HEENT examination shows no acute change.  Neck supple without adenopathy or thyromegaly.  Lungs clear.  Heart regular rate and rhythm without murmur.  Abdomen shows no masses tenderness or hepatosplenomegaly.  No pedal edema.  He does have monofilament sensation in all areas tested both feet.  Is alert with clear speech.  Has normal affect and normal mood.

## 2021-06-21 NOTE — PROGRESS NOTES
Dear Dr. Malcolm Gold MD  [address] [facility]  [facility]  [address]  [place] [address],    Thank you for the opportunity to participate in the care of Darius Escamilla.     He is a 63 y.o. y/o male patient who comes to the sleep medicine clinic for a follow up visit.    He was initially diagnosed with severe OSAH  and has been using CPAP therapy for many years.    Since our last visit, he completed a new sleep study because he was considering the Inspire device. He completed this test at an outside facility and brought a partial report with him today.    This test was completed 9/14/18 and mentions an AHI of 44.9 and a recommendation for CPAP of 11 cwp.    Current PAP settings: CPAP = 16 cwp.      30-Days Efficacy and Compliance Data  Residual AHI: 2.0  Leak: 0  Days used > 4 hours: 100%  Average usage per night: 7 hours      Mask Tolerance: He uche like to change to a FFM  Skin irritation: no      Past Medical History:   Diagnosis Date     Allergic rhinitis      Atrial fibrillation (H)      Chronic back pain      Coronary artery disease     1999-no stents, no significant damage. Echo 7/2016, EKG 1/2017     Degenerative joint disease (DJD) of lumbar spine     traumatic fall L1-L3     Depression      Hard of hearing      Hypercholesterolemia      Hyperparathyroidism (H)      Hypertension      Low zinc level      Lower leg edema      Metabolic syndrome      Rosacea      Sleep apnea     CPAP     Type 2 diabetes mellitus (H)     A1c 6.9 3/2017       Past Surgical History:   Procedure Laterality Date     ATRIAL ABLATION SURGERY  02/2018     COLONOSCOPY  2008     MS LAP, ADITHYA RESTRICT PROC, LONGITUDINAL GASTRECTOMY N/A 8/7/2017    Procedure: LAPAROSCOPIC SLEEVE GASTRECTOMY;  Surgeon: Ivan Watts MD;  Location: [facility] OR;  Service: General     MS REMOVAL OF HEEL SPUR Right 3/10/2015    Procedure: RETROCALCANEAL BONE SPUR EXCISION, RIGHT FOOT;  Surgeon: Fantasma Skinner DPM;  Location[facility];   Service: Podiatry     IA REMV CATARACT EXTRACAP,INSERT LENS      Description: Extracaps Cataract Extract With Prosthesis Insert Left Eye;  Proc Date: 04/16/2009;  Comments: Dr. David Schmitz-       Social History     Social History     Marital status:      Spouse name: N/A     Number of children: N/A     Years of education: N/A     Occupational History     Not on file.     Social History Main Topics     Smoking status: Never Smoker     Smokeless tobacco: Never Used     Alcohol use Yes      Comment: 1-5 per month or less     Drug use: No     Sexual activity: Yes     Partners: Female     Other Topics Concern     Not on file     Social History Narrative    , 2 children one in [place] one [place]    2 grandsons who play soccer    Works for [employer] for over 40 yrs. Plans to retire in 2018       Review of Systems:  General: No weight gain, no weight loss  Eyes: No vision changes  ENT: No hearing changes  Cardio: No chest pain, no nocturnal dyspnea  Respiratory: No shortness of breath, no cough  Gastrointestinal: No diarrhea, no constipation  Genitourinary: No excessive urination  Tegumentary: No rashes  Neurological: No seizures, no loss of consciousness  Endo: No heat or cold intolerance.    Current Outpatient Prescriptions   Medication Sig Dispense Refill     apixaban (ELIQUIS) 5 mg Tab tablet Take 5 mg by mouth 2 (two) times a day.       aspirin 81 MG EC tablet Take 81 mg by mouth.       atorvastatin (LIPITOR) 80 MG tablet TAKE ONE TABLET BY MOUTH AT BEDTIME 90 tablet 2     calcium citrate-vitamin D3 250 mg calcium- 200 unit Tab Take 1 tablet by mouth 2 (two) times a day.       CARTIA  mg 24 hr capsule TAKE ONE CAPSULE BY MOUTH TWICE A  capsule 3     cholecalciferol, vitamin D3, 5,000 unit Tab Take 5,000 Units by mouth bedtime. Take as directed.       cyanocobalamin, vitamin B-12, 1,000 mcg Subl Place 1,000 mcg under the tongue daily.       multivitamin with iron (ONE DAILY WITH IRON) Tab  "tablet Take 1 tablet by mouth 2 (two) times a day.       olmesartan (BENICAR) 20 MG tablet Take 20 mg by mouth daily.       omeprazole (PRILOSEC) 20 MG capsule Take 1 capsule (20 mg total) by mouth daily. 90 capsule prn     sertraline (ZOLOFT) 100 MG tablet TAKE 2.5 TABLETS (250MG) EACH EVENING 225 tablet 2     spironolactone (ALDACTONE) 25 MG tablet TAKE 1 TABLET (25 MG TOTAL) BY MOUTH DAILY. 90 tablet 3     tamsulosin (FLOMAX) 0.4 mg Cp24 TAKE 1 CAPSULE (0.4 MG TOTAL) BY MOUTH DAILY AFTER SUPPER. 90 capsule 3     traZODone (DESYREL) 50 MG tablet Take 2 tablets (100 mg total) by mouth at bedtime. 60 tablet 3     zinc gluconate 50 mg tablet Take 1 tablet (50 mg total) by mouth daily. 90 tablet 1     dabigatran etexilate (PRADAXA) 150 mg capsu Take 150 mg by mouth.       No current facility-administered medications for this visit.        Allergies   Allergen Reactions     Amlodipine Other (See Comments)     ankle swelling       Benazepril Cough     Cat Dander      Clonidine Other (See Comments) and Dizziness     mood changes    mood changes       Mold Itching     nasal     Penicillins Other (See Comments)     Unknown childhood reaction       Physical Exam:  Pulse 69  Ht 5' 11\" (1.803 m)  Wt 216 lb (98 kg)  SpO2 98%  BMI 30.13 kg/m2  BMI:Body mass index is 30.13 kg/(m^2).   GEN: NAD, obese  Neurological: Alert, oriented to time, place, and person.  Psych: normal mood, normal affect     Labs/Studies:     I reviewed the efficacy and compliance report from his device. Data summarized on the HPI.     Lab Results   Component Value Date    WBC 6.5 08/08/2018    HGB 13.6 (L) 08/08/2018    HCT 40.4 08/08/2018    MCV 92 08/08/2018     08/08/2018         Chemistry        Component Value Date/Time     09/24/2018 0828    K 3.8 09/24/2018 0828     09/24/2018 0828    CO2 29 09/24/2018 0828    BUN 18 09/24/2018 0828    CREATININE 0.79 09/24/2018 0828    GLU 87 09/24/2018 0828        Component Value Date/Time "    CALCIUM 9.4 09/24/2018 0828    ALKPHOS 83 09/24/2018 0828    AST 15 09/24/2018 0828    ALT 19 09/24/2018 0828    BILITOT 0.7 09/24/2018 0828            Lab Results   Component Value Date    FERRITIN 72 08/08/2018           Assessment and Plan:  In summary Darius Escamilla is a 63 y.o. year old male who is here for follow up.    1. Obstructive Sleep Apnea.  Residual AHI is excellent with P= 16 but his most recent test showed that P= 11 could work.  Compliance is excellent  Patient is benefiting from using PAP therapy.   We will change the pressure settings to: P=11 cwp  I am not sure if CPAP of 11 was sampled for a long period of time during the titration portion of his most recent test and I asked the patient to contact us in 2-3 days to run a new report.     We counseled the patient on the importannce of using his PAP device every night and the risks of not treating sleep apnea.      Patient verbalized understanding of these issues, agrees with the plan and all questions were answered today. Patient was given an opportuntity to voice any other symptoms or concerns not listed above. Patient did not have any other symptoms or concerns.      Patient told to return in 12 months.     I explained to the patient that I will be transitioning to a different job soon. I reassured him that this transition should not cause any significant disruptions to his care. I provided some information on the process and encouraged him to contact our main number if he has any questions or needs any help.    Bill Hendricks MD  [facility] Board Certified in Internal Medicine and Sleep Medicine  [facility].    We spent a total of 25 minutes of face-to-face encounter and more than 50% of the encounter was used for counseling or coordination of care.

## 2021-06-21 NOTE — PROGRESS NOTES
Order for Durable Medical Equipment was processed and equipment ordered.     DME provider: [facility]    Date Faxed: 10/24/18    Ordering Provider: Dr. Hendricks    Equipment ordered: CPAP Supplies

## 2021-06-21 NOTE — PROGRESS NOTES
Assessment:  1.  Gross hematuria, intermittent.  2.  History of atrial fibrillation but status post ablation in January of this year.  3.  Underlying diabetes.    Plan: Check urine culture, check basic profile and CBC, schedule CT of abdomen and pelvis with IV contrast to look at kidneys bladder and prostate.  Refer to urology and will need cystoscopy as a part of that.  Because he has not been having any symptomatic atrial fibrillation and because he had the ablation back in January, and because of this gross hematuria, will have him hold the Eliquis at this time pending the above evaluation.  After those results are known, then we can have him talk with cardiology regarding whether to restart the Eliquis or not.  Also follow-up here after the above evaluation.  To push fluids.  If he has any trouble with urinary retention, then be seen immediately at the ER.    Subjective: 63-year-old male presenting for blood in the urine.  He notes that he has been having it most of the days in the last week.  He had had about a month ago one time and then it stopped.  No fever nausea or vomiting.  No prior history of blood in the urine.  Last January he had ablation for atrial fibrillation done by his cardiologist.  He is on aspirin and Eliquis.  Past Medical History:   Diagnosis Date     Allergic rhinitis      Atrial fibrillation (H)      Chronic back pain      Coronary artery disease     1999-no stents, no significant damage. Echo 7/2016, EKG 1/2017     Degenerative joint disease (DJD) of lumbar spine     traumatic fall L1-L3     Depression      Hard of hearing      Hypercholesterolemia      Hyperparathyroidism (H)      Hypertension      Low zinc level      Lower leg edema      Metabolic syndrome      Rosacea      Sleep apnea     CPAP     Type 2 diabetes mellitus (H)     A1c 6.9 3/2017     Allergies   Allergen Reactions     Amlodipine Other (See Comments)     ankle swelling       Benazepril Cough     Cat Dander      Clonidine  Other (See Comments) and Dizziness     mood changes    mood changes       Mold Itching     nasal     Penicillins Other (See Comments)     Unknown childhood reaction     Current Outpatient Prescriptions   Medication Sig Dispense Refill     apixaban (ELIQUIS) 5 mg Tab tablet Take 5 mg by mouth 2 (two) times a day.       aspirin 81 MG EC tablet Take 81 mg by mouth.       atorvastatin (LIPITOR) 80 MG tablet TAKE ONE TABLET BY MOUTH AT BEDTIME 90 tablet 2     calcium citrate-vitamin D3 250 mg calcium- 200 unit Tab Take 1 tablet by mouth 2 (two) times a day.       CARTIA  mg 24 hr capsule TAKE ONE CAPSULE BY MOUTH TWICE A  capsule 3     cholecalciferol, vitamin D3, 5,000 unit Tab Take 5,000 Units by mouth bedtime. Take as directed.       cyanocobalamin, vitamin B-12, 1,000 mcg Subl Place 1,000 mcg under the tongue daily.       multivitamin with iron (ONE DAILY WITH IRON) Tab tablet Take 1 tablet by mouth 2 (two) times a day.       olmesartan (BENICAR) 20 MG tablet Take 20 mg by mouth daily.       sertraline (ZOLOFT) 100 MG tablet TAKE 2.5 TABLETS (250MG) EACH EVENING 225 tablet 2     spironolactone (ALDACTONE) 25 MG tablet TAKE 1 TABLET (25 MG TOTAL) BY MOUTH DAILY. 90 tablet 3     traZODone (DESYREL) 50 MG tablet Take 2 tablets (100 mg total) by mouth at bedtime. 60 tablet 3     zinc gluconate 50 mg tablet Take 1 tablet (50 mg total) by mouth daily. 90 tablet 1     No current facility-administered medications for this visit.      All other review of systems are negative for any other changes.    Objective:/84  Pulse 78  Wt 215 lb (97.5 kg)  SpO2 97%  BMI 29.99 kg/m2  HEENT exam shows no acute change.  Neck supple without adenopathy or thyromegaly.  Lungs clear.  Heart regular rate and rhythm without murmur.  Abdomen shows no masses tenderness or hepatosplenomegaly.  Genitalia normal normal testes and no hernia.  Rectal exam shows prostate is smooth and enlarged but no focal nodules.    Recent  Results (from the past 240 hour(s))   Urinalysis Macroscopic   Result Value Ref Range    Color, UA Brown (!) Colorless, Yellow, Straw, Light Yellow    Clarity, UA Cloudy (!) Clear    Glucose, UA Negative Negative    Bilirubin, UA Negative Negative    Ketones, UA Negative Negative    Specific Gravity, UA 1.025 1.005 - 1.030    Blood, UA Large (!) Negative    pH, UA 5.5 5.0 - 8.0    Protein,  mg/dL (!) Negative mg/dL    Urobilinogen, UA 0.2 E.U./dL 0.2 E.U./dL, 1.0 E.U./dL    Nitrite, UA Negative Negative    Leukocytes, UA Trace (!) Negative

## 2021-06-23 NOTE — PROGRESS NOTES
Bariatric Follow Up Visit with a History of Previous Bariatric Surgery     Date of visit: 2/8/2019  Physician: Yoko Arias MD  Primary Care Provider:  Malcolm Gold MD Dale HOLLI Francine   63 y.o.  male    Date of Surgery: 8/7/2017  Initial Weight: 284#  Initial BMI: 39.33  Today's Weight:   Wt Readings from Last 1 Encounters:   02/07/19 217 lb (98.4 kg)     Body mass index is 30.27 kg/m .      Assessment and Plan     Assessment: Darius is a 63 y.o. year old male who is 18 mo s/p  Sleeve Gastrectomy with Dr. Stefanie Escamilla feels as if he has achieved the goals he hoped to accomplish through bariatric surgery and weight loss.    Encounter Diagnosis   Name Primary?     Postoperative malabsorption Yes         Current Outpatient Medications:      apixaban (ELIQUIS) 5 mg Tab tablet, Take 5 mg by mouth 2 (two) times a day., Disp: , Rfl:      aspirin 81 MG EC tablet, Take 81 mg by mouth., Disp: , Rfl:      atorvastatin (LIPITOR) 80 MG tablet, TAKE ONE TABLET BY MOUTH AT BEDTIME, Disp: 90 tablet, Rfl: 2     calcium citrate-vitamin D3 250 mg calcium- 200 unit Tab, Take 1 tablet by mouth 2 (two) times a day., Disp: , Rfl:      CARTIA  mg 24 hr capsule, TAKE ONE CAPSULE BY MOUTH TWICE A DAY, Disp: 180 capsule, Rfl: 3     cholecalciferol, vitamin D3, 5,000 unit Tab, Take 5,000 Units by mouth bedtime. Take as directed., Disp: , Rfl:      cyanocobalamin, vitamin B-12, 1,000 mcg Subl, Place 1,000 mcg under the tongue daily., Disp: , Rfl:      multivitamin with iron (ONE DAILY WITH IRON) Tab tablet, Take 1 tablet by mouth 2 (two) times a day., Disp: , Rfl:      olmesartan (BENICAR) 20 MG tablet, Take 20 mg by mouth daily., Disp: , Rfl:      sertraline (ZOLOFT) 100 MG tablet, TAKE 2.5 TABLETS (250MG) EACH EVENING, Disp: 225 tablet, Rfl: 2     spironolactone (ALDACTONE) 25 MG tablet, TAKE 1 TABLET (25 MG TOTAL) BY MOUTH DAILY., Disp: 90 tablet, Rfl: 3     tamsulosin (FLOMAX) 0.4 mg cap, Take 0.4 mg by mouth.,  Disp: , Rfl:      traZODone (DESYREL) 50 MG tablet, Take 2 tablets (100 mg total) by mouth at bedtime., Disp: 180 tablet, Rfl: 3     zinc gluconate 50 mg tablet, Take 1 tablet (50 mg total) by mouth daily., Disp: 90 tablet, Rfl: 1    Plan: Labs today. Continue consistent physical activity. Discussed [employer]. Continue vitamins as is. Will adjust if needed according to lab results    Return in about 6 months (around 8/7/2019).    Bariatric Surgery Review     Interim History/LifeChanges: retired this week. Doing well. Weight Stable. A1c 5.7 and lipids excellent. He tried omeprazole for LUQ pain but it didn't work.  Hematuria. Had a kidney stone. Passed and saw [facility].   Patient Concerns: had a stone  Appetite (1-10): OK  GERD: no    Medication changes: no    Vitamin Intake:   B-12   SL   MVI  2/d   Vitamin D  5,000   Calcium   citrate     Other                LABS: ordered.     Nausea no  Vomiting no  Constipation no  Diarrhea no  Rashes no  Hair Loss no  Calf tenderness no  Breathing difficulty no  Reactive Hypoglycemia feels bad with roast beef  Light Headedness no   Moods OK    12 point ROS as above and otherwise negative      Habits:  Alcohol: 0-4  Tobacco: no  Caffeine tea, 1-2c/d  NSAIDS ASA 81mg  Exercise Routine: walks his dog.   3 meals/day yes  Protein first yes60  60 grams/day  Water Separate from meals yes  Calorie Containing Beverages no  Restaurant eating/wk <2  Sleeping OK  Stress low-just retired  CPAP: consistent  Contraception: NA    Social History     Social History     Socioeconomic History     Marital status:      Spouse name: Not on file     Number of children: Not on file     Years of education: Not on file     Highest education level: Not on file   Social Needs     Financial resource strain: Not on file     Food insecurity - worry: Not on file     Food insecurity - inability: Not on file     Transportation needs - medical: Not on file     Transportation needs - non-medical:  Not on file   Occupational History     Not on file   Tobacco Use     Smoking status: Never Smoker     Smokeless tobacco: Never Used   Substance and Sexual Activity     Alcohol use: Yes     Comment: 1-5 per month or less     Drug use: No     Sexual activity: Yes     Partners: Female   Other Topics Concern     Not on file   Social History Narrative    , 2 children one in [place] one [place]    2 grandsons who play soccer    Works for [employer] for over 40 yrs. Plans to retire in 2018       Past Medical History     Past Medical History:   Diagnosis Date     Allergic rhinitis      Atrial fibrillation (H)      Chronic back pain      Coronary artery disease     1999-no stents, no significant damage. Echo 7/2016, EKG 1/2017     Degenerative joint disease (DJD) of lumbar spine     traumatic fall L1-L3     Depression      Hard of hearing      Hypercholesterolemia      Hyperparathyroidism (H)      Hypertension      Low zinc level      Lower leg edema      Metabolic syndrome      Rosacea      Sleep apnea     CPAP     Type 2 diabetes mellitus (H)     A1c 6.9 3/2017     Problem List     Patient Active Problem List   Diagnosis     ELINOR on CPAP     Onychomycosis     Rosacea     Allergic Rhinitis     Recurrent major depression in partial remission (H)     Essential Hypercholesterolemia     Essential hypertension     Vitamin D Deficiency     Calcaneal spur of right foot     Obesity     Degenerative joint disease (DJD) of lumbar spine     Chronic back pain     Lower leg edema     Coronary artery disease     Hard of hearing     Hyperparathyroidism (H)     Low zinc level     Diabetes mellitus (H)     Paroxysmal atrial fibrillation (H)     Benign prostatic hyperplasia with nocturia     Medications     Current Outpatient Medications   Medication Sig Note     apixaban (ELIQUIS) 5 mg Tab tablet Take 5 mg by mouth 2 (two) times a day.      aspirin 81 MG EC tablet Take 81 mg by mouth. 9/24/2018: Received from: [facility]  "[facility] Received Sig: Take 1 tablet by mouth once daily with a meal.     atorvastatin (LIPITOR) 80 MG tablet TAKE ONE TABLET BY MOUTH AT BEDTIME      calcium citrate-vitamin D3 250 mg calcium- 200 unit Tab Take 1 tablet by mouth 2 (two) times a day.      CARTIA  mg 24 hr capsule TAKE ONE CAPSULE BY MOUTH TWICE A DAY      cholecalciferol, vitamin D3, 5,000 unit Tab Take 5,000 Units by mouth bedtime. Take as directed.      cyanocobalamin, vitamin B-12, 1,000 mcg Subl Place 1,000 mcg under the tongue daily.      multivitamin with iron (ONE DAILY WITH IRON) Tab tablet Take 1 tablet by mouth 2 (two) times a day.      olmesartan (BENICAR) 20 MG tablet Take 20 mg by mouth daily.      sertraline (ZOLOFT) 100 MG tablet TAKE 2.5 TABLETS (250MG) EACH EVENING      spironolactone (ALDACTONE) 25 MG tablet TAKE 1 TABLET (25 MG TOTAL) BY MOUTH DAILY.      tamsulosin (FLOMAX) 0.4 mg cap Take 0.4 mg by mouth.      traZODone (DESYREL) 50 MG tablet Take 2 tablets (100 mg total) by mouth at bedtime.      zinc gluconate 50 mg tablet Take 1 tablet (50 mg total) by mouth daily.      Surgical History     Past Surgical History  He has a past surgical history that includes pr remv cataract extracap,insert lens; Colonoscopy (2008); Atrial ablation surgery (02/2018); LAPAROSCOPIC SLEEVE GASTRECTOMY (N/A, 8/7/2017); and RETROCALCANEAL BONE SPUR EXCISION, RIGHT FOOT (Right, 3/10/2015).    Objective-Exam     Constitutional:  /81   Pulse 77   Resp 18   Ht 5' 11\" (1.803 m)   Wt 217 lb (98.4 kg)   SpO2 100%   BMI 30.27 kg/m    Height: 5' 11\" (1.803 m) (2/7/2019 10:55 AM)  Initial Weight: 284 lbs (8/8/2018  1:45 PM)  Weight: 217 lb (98.4 kg) (2/7/2019 10:55 AM)  Weight loss from initial: 69 (8/8/2018  1:45 PM)  % Weight loss: 24.3 % (8/8/2018  1:45 PM)  BMI (Calculated): 30.3 (2/7/2019 10:55 AM)  SpO2: 100 % (2/7/2019 10:55 AM)  Waist Circumference (In): 45 Inches (8/8/2018  1:45 PM)  Hip Circumference (In): 43 Inches " (8/8/2018  1:45 PM)  Neck Circumference (In): 16 Inches (8/8/2018  1:45 PM)    General:  Pleasant and in no acute distress   Eyes:  EOMI  ENT:  Airway 2+  Moist mucous membranes  Neck:  Supple, No LAD, No thyromegaly, No carotid bruits appreciated  Respiratory: Normal respiratory effort, no cough, wheezes or crackles  CV:  Regular rate and Rhythm,no murmurs, pulses 2+, no calf tenderness, no LE edema  Gastrointestinal: Abdomen NT/ND, BS+  Musculoskeletal: muscle mass WNL  Skin: color tan hair baseline, incisions nicely healed  Neurological: No tremor, normal gait  Psychiatric: alert and oriented X3, mood and affect normal    Counseling     We reviewed the important post op bariatric recommendations:  -eating 3 meals daily  -eating protein first, getting >60gm protein daily  -eating slowly, chewing food well  -avoiding/limiting calorie containing beverages  -drinking water 15-30 minutes before or after meals  -choosing wheat, not white with breads, crackers, pastas, pardeep, bagels, tortillas, rice  -limiting restaurant or cafeteria eating to twice a week or less    We discussed the importance of restorative sleep and stress management in maintaining a healthy weight.  We discussed the National Weight Control Registry healthy weight maintenance strategies and ways to optimize metabolism.  We discussed the importance of physical activity including cardiovascular and strength training in maintaining a healthier weight.    We discussed the importance of life-long vitamin supplementation and life-long  follow-up.    Darius was reminded that, to avoid marginal ulcers he should avoid tobacco at all, alcohol in excess, caffeine in excess, and NSAIDS (unless indicated for cardioprotection or othewise and opposed by a PPI).    Yoko Arias MD, FAAFP  [facility].  2/8/2019  11:26 AM      No images are attached to the encounter.   30 minutes spent with patient. >50% in counseling and coordination of  care.   No

## 2021-06-23 NOTE — PROGRESS NOTES
Assessment:  1.  Non-insulin-dependent diabetes mellitus, checking status.  2.  Coronary artery disease, is emphatic.  3.  Hypertension controlled.  4.  Hyperlipidemia, checking status.  5.  Obstructive sleep apnea, on CPAP.  6.  Status post bariatric surgery.    Plan: Check A1c and lipid profile and he is fasting.  Then the labs that were ordered by the bariatric program will also be done today.  Continue his excellent exercise and diet program.  Follow-up in 4 months for next regular diabetic recheck but earlier as needed.  Continue current medication.  It is too soon today to get his Pneumovax since he had the Prevnar last September.  We will plan on getting the Pneumovax in September of this year.    Subjective: 63-year-old male presenting for follow-up on the above.  He notes that he just retired and today is his first day of shelter.  He has plans to visit nursing homes with their therapy dogs etc.  Regarding diabetes he is not checking blood sugars because of the significant weight reduction since his bariatric surgery and improvement in his diabetes.  Regarding heart no chest pain trouble breathing or leg swelling.  He notes he did see cardiology he did have EKG in January.  Regarding hypertension no headaches or dizziness or leg swelling.  Regarding lipids no diarrhea constipation muscle aching or muscle weakness.  He does use the CPAP for sleep apnea and feels that is been working well.  Patient Active Problem List   Diagnosis     ELINOR on CPAP     Onychomycosis     Rosacea     Allergic Rhinitis     Recurrent major depression in partial remission (H)     Essential Hypercholesterolemia     Essential hypertension     Vitamin D Deficiency     Calcaneal spur of right foot     Obesity     Degenerative joint disease (DJD) of lumbar spine     Chronic back pain     Lower leg edema     Coronary artery disease     Hard of hearing     Hyperparathyroidism (H)     Low zinc level     Diabetes mellitus (H)      "Paroxysmal atrial fibrillation (H)     Benign prostatic hyperplasia with nocturia     Allergies   Allergen Reactions     Amlodipine Other (See Comments)     ankle swelling       Benazepril Cough     Cat Dander      Clonidine Other (See Comments) and Dizziness     mood changes    mood changes       Mold Itching     nasal     Penicillins Other (See Comments)     Unknown childhood reaction     Current Outpatient Medications   Medication Sig Dispense Refill     apixaban (ELIQUIS) 5 mg Tab tablet Take 5 mg by mouth 2 (two) times a day.       aspirin 81 MG EC tablet Take 81 mg by mouth.       atorvastatin (LIPITOR) 80 MG tablet TAKE ONE TABLET BY MOUTH AT BEDTIME 90 tablet 2     calcium citrate-vitamin D3 250 mg calcium- 200 unit Tab Take 1 tablet by mouth 2 (two) times a day.       CARTIA  mg 24 hr capsule TAKE ONE CAPSULE BY MOUTH TWICE A  capsule 3     cholecalciferol, vitamin D3, 5,000 unit Tab Take 5,000 Units by mouth bedtime. Take as directed.       cyanocobalamin, vitamin B-12, 1,000 mcg Subl Place 1,000 mcg under the tongue daily.       multivitamin with iron (ONE DAILY WITH IRON) Tab tablet Take 1 tablet by mouth 2 (two) times a day.       olmesartan (BENICAR) 20 MG tablet Take 20 mg by mouth daily.       sertraline (ZOLOFT) 100 MG tablet TAKE 2.5 TABLETS (250MG) EACH EVENING 225 tablet 2     spironolactone (ALDACTONE) 25 MG tablet TAKE 1 TABLET (25 MG TOTAL) BY MOUTH DAILY. 90 tablet 3     tamsulosin (FLOMAX) 0.4 mg cap Take 0.4 mg by mouth.       traZODone (DESYREL) 50 MG tablet Take 2 tablets (100 mg total) by mouth at bedtime. 180 tablet 3     zinc gluconate 50 mg tablet Take 1 tablet (50 mg total) by mouth daily. 90 tablet 1     No current facility-administered medications for this visit.      All other review of systems are negative.    Objective:/70   Pulse 73   Ht 5' 11\" (1.803 m)   Wt 218 lb (98.9 kg)   SpO2 96%   BMI 30.40 kg/m     HEENT shows no acute change.  Neck supple " without adenopathy or thyromegaly.  Lungs clear.  Heart regular rate and rhythm without murmur.  Abdomen shows no masses tenderness or hepatospleno megaly.  No pedal edema.  He is alert with clear speech.

## 2021-06-25 NOTE — TELEPHONE ENCOUNTER
Refill Approved    Rx renewed per Medication Renewal Policy. Medication was last renewed on 4/6/2018.           Nic Stewart, [facility] Triage/Med Refill 3/14/2019     Requested Prescriptions   Pending Prescriptions Disp Refills     CARTIA  mg 24 hr capsule [Pharmacy Med Name: CARTIA /24HR CAP  CAPSULE] 180 capsule 3     Sig: TAKE ONE CAPSULE BY MOUTH TWICE A DAY    Calcium-Channel Blockers Protocol Passed - 3/10/2019  8:00 AM       Passed - PCP or prescribing provider visit in past 12 months or next 3 months    Last office visit with prescriber/PCP: 2/4/2019 Malcolm Gold MD OR same dept: 2/4/2019 Malcolm Gold MD OR same specialty: 2/4/2019 Malcolm Gold MD  Last physical: 7/17/2017 Last MTM visit: Visit date not found   Next visit within 3 mo: Visit date not found  Next physical within 3 mo: Visit date not found  Prescriber OR PCP: Malcolm Gold MD  Last diagnosis associated with med order: 1. Benign Essential Hypertension  - CARTIA  mg 24 hr capsule [Pharmacy Med Name: CARTIA /24HR CAP  CAPSULE]; TAKE ONE CAPSULE BY MOUTH TWICE A DAY  Dispense: 180 capsule; Refill: 3    If protocol passes may refill for 12 months if within 3 months of last provider visit (or a total of 15 months).            Passed - Blood pressure filed in past 12 months    BP Readings from Last 1 Encounters:   02/07/19 130/81

## 2021-06-26 NOTE — PROGRESS NOTES
Progress Notes by Yoko Nowak MD at 8/8/2018  1:30 PM     Author: Yoko Nowak MD Service: -- Author Type: Physician    Filed: 8/8/2018  2:40 PM Encounter Date: 8/8/2018 Status: Signed    : Yoko Nowak MD (Physician)       Bariatric Follow Up Visit with a History of Previous Bariatric Surgery     Date of visit: 8/8/2018  Physician: Yoko Arias MD  Primary Care Provider:  MD Darius Perez   63 y.o.  male    Date of Surgery: 8/7/2018  Initial Weight: 284  Initial BMI: 39.33  Today's Weight:   Wt Readings from Last 1 Encounters:   08/08/18 215 lb (97.5 kg)     Body mass index is 29.99 kg/(m^2).      Assessment and Plan     Assessment: Darius is a 63 y.o. year old male who is 1 yr s/p  Sleeve Gastrectomy with Dr. Stefanie Escamilla feels as if he has achieved the goals he hoped to accomplish through bariatric surgery and weight loss.    Encounter Diagnosis   Name Primary?   ? RUQ abdominal pain Yes         Current Outpatient Prescriptions:   ?  apixaban (ELIQUIS) 5 mg Tab tablet, Take 5 mg by mouth 2 (two) times a day., Disp: , Rfl:   ?  atorvastatin (LIPITOR) 80 MG tablet, TAKE ONE TABLET BY MOUTH AT BEDTIME, Disp: 90 tablet, Rfl: 2  ?  calcium citrate-vitamin D3 250 mg calcium- 200 unit Tab, Take 1 tablet by mouth 2 (two) times a day., Disp: , Rfl:   ?  CARTIA  mg 24 hr capsule, TAKE ONE CAPSULE BY MOUTH TWICE A DAY, Disp: 180 capsule, Rfl: 3  ?  cholecalciferol, vitamin D3, 5,000 unit Tab, Take 5,000 Units by mouth bedtime. Take as directed., Disp: , Rfl:   ?  clopidogrel (PLAVIX) 75 mg tablet, TAKE ONE TABLET BY MOUTH EVERY DAY *PLEASE KEEP 7/17 APPT*, Disp: 90 tablet, Rfl: 0  ?  cyanocobalamin, vitamin B-12, 1,000 mcg Subl, Place 1,000 mcg under the tongue daily., Disp: , Rfl:   ?  multivitamin with iron (ONE DAILY WITH IRON) Tab tablet, Take 1 tablet by mouth 2 (two) times a day., Disp: , Rfl:   ?  olmesartan (BENICAR) 20 MG tablet, Take 20 mg by  mouth daily., Disp: , Rfl:   ?  sertraline (ZOLOFT) 100 MG tablet, Take 2.5 tablets (250 mg total) by mouth every evening., Disp: 225 tablet, Rfl: 1  ?  spironolactone (ALDACTONE) 25 MG tablet, TAKE 1 TABLET (25 MG TOTAL) BY MOUTH DAILY., Disp: 90 tablet, Rfl: 3  ?  tamsulosin (FLOMAX) 0.4 mg Cp24, TAKE 1 CAPSULE (0.4 MG TOTAL) BY MOUTH DAILY AFTER SUPPER., Disp: 90 capsule, Rfl: 3  ?  traZODone (DESYREL) 50 MG tablet, Take 2 tablets (100 mg total) by mouth at bedtime., Disp: 60 tablet, Rfl: 3  ?  dabigatran etexilate (PRADAXA) 150 mg capsu, Take 150 mg by mouth., Disp: , Rfl:     Plan:ultrasound r/o gallstones, continue vitamins with consistency. F/u 6 mo. Silver and fit after skilled nursing.    Return in about 6 months (around 2/8/2019).    Bariatric Surgery Review     Interim History/LifeChanges: Maintains a 70# weight loss    Patient Concerns: weight gain, RUQ pain   Appetite (1-10): up  GERD: no    Medication changes: spironolactone    Vitamin Intake:   B-12   SL   MVI  2/d   Vitamin D  5,000   Calcium   citrate     Other                LABS: ordered  Can have his labs done today  Nausea no  Vomiting no  Constipation no  Diarrhea no  Rashes no  Hair Loss no  Calf tenderness no  Breathing difficulty no  Reactive Hypoglycemia no  Light Headedness sometimes   Moods OK    12 point ROS as above and otherwise negative      Habits:  Alcohol: 0-2  Tobacco: no  Caffeine 1c/d  NSAIDS no  Exercise Routine: walking daily, will do strength training when he retires  3 meals/day yes  Protein first yes  60grams/day  Water Separate from meals yes  Calorie Containing Beverages no  Restaurant eating/wk 0-1  Sleeping poorly lately for a couple of months  Stress baseline  CPAP: consistent  Contraception: NA    Social History     Social History     Social History   ? Marital status:      Spouse name: N/A   ? Number of children: N/A   ? Years of education: N/A     Occupational History   ? Not on file.     Social History Main  Topics   ? Smoking status: Never Smoker   ? Smokeless tobacco: Never Used   ? Alcohol use Yes      Comment: 1-5 per month or less   ? Drug use: No   ? Sexual activity: Yes     Partners: Female     Other Topics Concern   ? Not on file     Social History Narrative    , 2 children one in [place] one [place]    2 grandsons who play soccer    Works for [employer] for over 40 yrs. Plans to retire in 2018       Past Medical History     Past Medical History:   Diagnosis Date   ? Allergic rhinitis    ? Atrial fibrillation (H)    ? Chronic back pain    ? Coronary artery disease     1999-no stents, no significant damage. Echo 7/2016, EKG 1/2017   ? Degenerative joint disease (DJD) of lumbar spine     traumatic fall L1-L3   ? Depression    ? Hard of hearing    ? Hypercholesterolemia    ? Hyperparathyroidism (H)    ? Hypertension    ? Low zinc level    ? Lower leg edema    ? Metabolic syndrome    ? Rosacea    ? Sleep apnea     CPAP   ? Type 2 diabetes mellitus (H)     A1c 6.9 3/2017     Problem List     Patient Active Problem List   Diagnosis   ? ELINOR on CPAP   ? Onychomycosis   ? Rosacea   ? Allergic Rhinitis   ? Recurrent major depression in partial remission (H)   ? Essential Hypercholesterolemia   ? Essential hypertension   ? Vitamin D Deficiency   ? Calcaneal spur of right foot   ? Obesity   ? Degenerative joint disease (DJD) of lumbar spine   ? Chronic back pain   ? Lower leg edema   ? Coronary artery disease   ? Hard of hearing   ? Hyperparathyroidism (H)   ? Low zinc level   ? Diabetes mellitus (H)   ? Paroxysmal atrial fibrillation (H)   ? Benign prostatic hyperplasia with nocturia     Medications     Current Outpatient Prescriptions   Medication Sig Note   ? apixaban (ELIQUIS) 5 mg Tab tablet Take 5 mg by mouth 2 (two) times a day.    ? atorvastatin (LIPITOR) 80 MG tablet TAKE ONE TABLET BY MOUTH AT BEDTIME    ? calcium citrate-vitamin D3 250 mg calcium- 200 unit Tab Take 1 tablet by mouth 2 (two) times a day.   "  ? CARTIA  mg 24 hr capsule TAKE ONE CAPSULE BY MOUTH TWICE A DAY    ? cholecalciferol, vitamin D3, 5,000 unit Tab Take 5,000 Units by mouth bedtime. Take as directed.    ? clopidogrel (PLAVIX) 75 mg tablet TAKE ONE TABLET BY MOUTH EVERY DAY *PLEASE KEEP 7/17 APPT*    ? cyanocobalamin, vitamin B-12, 1,000 mcg Subl Place 1,000 mcg under the tongue daily.    ? multivitamin with iron (ONE DAILY WITH IRON) Tab tablet Take 1 tablet by mouth 2 (two) times a day.    ? olmesartan (BENICAR) 20 MG tablet Take 20 mg by mouth daily.    ? sertraline (ZOLOFT) 100 MG tablet Take 2.5 tablets (250 mg total) by mouth every evening.    ? spironolactone (ALDACTONE) 25 MG tablet TAKE 1 TABLET (25 MG TOTAL) BY MOUTH DAILY.    ? tamsulosin (FLOMAX) 0.4 mg Cp24 TAKE 1 CAPSULE (0.4 MG TOTAL) BY MOUTH DAILY AFTER SUPPER.    ? traZODone (DESYREL) 50 MG tablet Take 2 tablets (100 mg total) by mouth at bedtime.    ? dabigatran etexilate (PRADAXA) 150 mg capsu Take 150 mg by mouth. 2/7/2018: [facility] Sig: Take 1 capsule by mouth 2 times daily for 30 days. Take 1/12 thru 2/11, Resume Eliquis once Pradaxa completed.     Surgical History     Past Surgical History  He has a past surgical history that includes pr remv cataract extracap,insert lens; pr removal of heel spur (Right, 3/10/2015); Colonoscopy (2008); pr lap, fortino restrict proc, longitudinal gastrectomy (N/A, 8/7/2017); and Atrial ablation surgery (02/2018).    Objective-Exam     Constitutional:  /74  Pulse 74  Temp 98.5  F (36.9  C) (Tympanic)   Resp 18  Ht 5' 11\" (1.803 m)  Wt 215 lb (97.5 kg)  SpO2 100%  BMI 29.99 kg/m2  Height: 5' 11\" (1.803 m) (8/8/2018  1:45 PM)  Initial Weight: 284 lbs (8/8/2018  1:45 PM)  Weight: 215 lb (97.5 kg) (8/8/2018  1:45 PM)  Weight loss from initial: 69 (8/8/2018  1:45 PM)  % Weight loss: 24.3 % (8/8/2018  1:45 PM)  BMI (Calculated): 30 (8/8/2018  1:45 PM)  SpO2: 100 % (8/8/2018 "  1:45 PM)  Waist Circumference (In): 45 Inches (8/8/2018  1:45 PM)  Hip Circumference (In): 43 Inches (8/8/2018  1:45 PM)  Neck Circumference (In): 16 Inches (8/8/2018  1:45 PM)  General:  Pleasant and in no acute distress   Eyes:  EOMI  ENT:  Airway 3+  Moist mucous membranes  Neck:  Supple, No LAD, No thyromegaly, No carotid bruits appreciated  Respiratory: Normal respiratory effort, no cough, wheezes or crackles  CV:  Regular rate and Rhythm, no murmurs, pulses 2+, no calf tenderness, no LE edema  Gastrointestinal: Abdomen NT/ND, BS+  Musculoskeletal: muscle mass WNL  Skin: color tan hair baseline, incisions nicely healed  Neurological: No tremor, normal gait  Psychiatric: alert and oriented X3, mood and affect normal    Counseling     We reviewed the important post op bariatric recommendations:  -eating 3 meals daily  -eating protein first, getting >60gm protein daily  -eating slowly, chewing food well  -avoiding/limiting calorie containing beverages  -drinking water 15-30 minutes before or after meals  -choosing wheat, not white with breads, crackers, pastas, pardeep, bagels, tortillas, rice  -limiting restaurant or cafeteria eating to twice a week or less    We discussed the importance of restorative sleep and stress management in maintaining a healthy weight.  We discussed the National Weight Control Registry healthy weight maintenance strategies and ways to optimize metabolism.  We discussed the importance of physical activity including cardiovascular and strength training in maintaining a healthier weight.    We discussed the importance of life-long vitamin supplementation and life-long  follow-up.    Darius was reminded that, to avoid marginal ulcers he should avoid tobacco at all, alcohol in excess, caffeine in excess, and NSAIDS (unless indicated for cardioprotection or othewise and opposed by a PPI).    Yoko Arias MD, [facility].  8/8/2018  2:07 PM               30  minutes spent with patient. >50% in counseling and coordination of care.

## 2021-06-26 NOTE — PROGRESS NOTES
Progress Notes by Yoko Nowak MD at 2/14/2018  1:30 PM     Author: Yoko Nowak MD Service: -- Author Type: Physician    Filed: 2/14/2018  2:44 PM Encounter Date: 2/14/2018 Status: Addendum    : Yoko Nowak MD (Physician)    Related Notes: Original Note by Yoko Nowak MD (Physician) filed at 2/14/2018  2:29 PM       Bariatric Follow Up Visit with a History of Previous Bariatric Surgery     Date of visit: 2/14/2018  Physician: Yoko Arias MD  Primary Care Provider:  MD Darius Perez   62 y.o.  male    Date of Surgery: 8/7/2017  Initial Weight: 284#  Initial BMI: 39.33  Today's Weight:   Wt Readings from Last 1 Encounters:   02/14/18 210 lb (95.3 kg)     Body mass index is 29.29 kg/(m^2).      Assessment and Plan     Assessment: Darius is a 62 y.o. year old male who is 6 mo s/p  Sleeve Gastrectomy with Dr. Stefanie Escamilla feels as if he has achieved the goals he hoped to accomplish through bariatric surgery and weight loss.    Encounter Diagnosis   Name Primary?   ? Postoperative malabsorption Yes         Current Outpatient Prescriptions:   ?  apixaban (ELIQUIS) 5 mg Tab tablet, Take 5 mg by mouth 2 (two) times a day., Disp: , Rfl:   ?  atorvastatin (LIPITOR) 80 MG tablet, TAKE ONE TABLET BY MOUTH AT BEDTIME, Disp: 90 tablet, Rfl: 1  ?  calcium citrate-vitamin D3 250 mg calcium- 200 unit Tab, Take 1 tablet by mouth 2 (two) times a day., Disp: , Rfl:   ?  CARTIA  mg 24 hr capsule, TAKE ONE CAPSULE BY MOUTH TWICE A DAY, Disp: 180 capsule, Rfl: 0  ?  cholecalciferol, vitamin D3, 5,000 unit Tab, Take 5,000 Units by mouth bedtime. Take as directed., Disp: , Rfl:   ?  clopidogrel (PLAVIX) 75 mg tablet, TAKE ONE TABLET BY MOUTH EVERY DAY *PLEASE KEEP 7/17 APPT*, Disp: 90 tablet, Rfl: 0  ?  cyanocobalamin, vitamin B-12, 1,000 mcg Subl, Place 1,000 mcg under the tongue daily., Disp: , Rfl:   ?  dronedarone (FOR MULTAQ) 400 mg tablet, Take 400 mg by  mouth 2 (two) times a day., Disp: , Rfl:   ?  olmesartan (BENICAR) 20 MG tablet, Take 20 mg by mouth daily., Disp: , Rfl:   ?  pediatric multivitamin (FLINTSTONES) Chew chewable tablet, Chew 1 tablet 2 (two) times a day., Disp: , Rfl:   ?  potassium chloride SA (K-DUR,KLOR-CON) 20 MEQ tablet, Take 20 mEq by mouth 4 (four) times a day. , Disp: , Rfl:   ?  sertraline (ZOLOFT) 100 MG tablet, Take 2.5 tablets (250 mg total) by mouth every evening., Disp: 225 tablet, Rfl: 1  ?  tamsulosin (FLOMAX) 0.4 mg Cp24, Take 1 capsule (0.4 mg total) by mouth daily after supper., Disp: 90 capsule, Rfl: 0  ?  ursodiol (ACTIGALL) 300 mg capsule, Take 1 capsule (300 mg total) by mouth 2 (two) times a day. Start 2 wks after surgery. Do not cut, crush or open. Take with warm liquids., Disp: 180 capsule, Rfl: 1  ?  dabigatran etexilate (PRADAXA) 150 mg capsu, Take 150 mg by mouth., Disp: , Rfl:     Plan: Continue vitamins with consistency. Strength training. 6 months post op labs ordered. He will do them in March when he sees Dr. Gold. Continue CPAP.    Return in about 3 months (around 5/14/2018). dietitian visit. Sooner if problems or concerns.    Bariatric Surgery Review     Interim History/LifeChanges: 74# down from his initial weight. Would like to get under 200. Has not had bariatric labs done. Has had a cardiac ablation and is doing well. Was in [place]. Will be retiring in November. Going back to [place] and [place] soon.     Patient Concerns: doing well    Medication changes: off of pradaxa    Vitamin Intake:   B-12   SL   MVI  2/d   Vitamin D  5,000   Calcium   citrate     Other  no              LABS: ordered    Nausea no  Vomiting no  Constipation no  Diarrhea no  Rashes n  Hair Loss no  Calf tenderness no  Breathing difficulty no  Reactive Hypoglycemia feels nauseated if he eats too much sugar  Light Headedness no   Moods OK    12 point ROS as above and otherwise negative      Habits:  Alcohol: 0-1  Tobacco: no  Caffeine  1/d  NSAIDS no  Exercise Routine: walking outside.  3 meals/day B: fruit and yogurt protein drink mid morning L: meatloaf or other leftovers D:   Protein first yes  60grams/day  Water Separate from meals 15 min  Calorie Containing Beverages none  Restaurant eating/wk 0-1  Sleeping 6-7 hours. Not enough. Using CPAP  Stress OK  CPAP: consistent  Contraception: NA    Social History     Social History     Social History   ? Marital status:      Spouse name: N/A   ? Number of children: N/A   ? Years of education: N/A     Occupational History   ? Not on file.     Social History Main Topics   ? Smoking status: Never Smoker   ? Smokeless tobacco: Never Used   ? Alcohol use Yes      Comment: 1-5 per month or less   ? Drug use: No   ? Sexual activity: Yes     Partners: Female     Other Topics Concern   ? Not on file     Social History Narrative    , 2 children one in  one [place]    2 grandsons who play soccer    Works for [employer] for over 40 yrs. Plans to retire in 2018       Past Medical History     Past Medical History:   Diagnosis Date   ? Allergic rhinitis    ? Atrial fibrillation    ? Chronic back pain    ? Coronary artery disease     1999-no stents, no significant damage. Echo 7/2016, EKG 1/2017   ? Degenerative joint disease (DJD) of lumbar spine     traumatic fall L1-L3   ? Depression    ? Hard of hearing    ? Hypercholesterolemia    ? Hyperparathyroidism    ? Hypertension    ? Low zinc level    ? Lower leg edema    ? Metabolic syndrome    ? Rosacea    ? Sleep apnea     CPAP   ? Type 2 diabetes mellitus     A1c 6.9 3/2017     Problem List     Patient Active Problem List   Diagnosis   ? ELINOR on CPAP   ? Onychomycosis   ? Rosacea   ? Allergic Rhinitis   ? Recurrent major depression in partial remission   ? Foot Pain (Soft Tissue)   ? Essential Hypercholesterolemia   ? Essential hypertension   ? Coronary Arteriosclerosis   ? Vitamin D Deficiency   ? Calcaneal spur of right foot   ? Obesity   ?  Degenerative joint disease (DJD) of lumbar spine   ? Chronic back pain   ? Lower leg edema   ? Coronary artery disease   ? Hard of hearing   ? Hyperparathyroidism   ? Low zinc level   ? Diabetes mellitus   ? Metabolic syndrome   ? Morbid (severe) obesity due to excess calories   ? Paroxysmal atrial fibrillation   ? Bradycardia   ? Benign prostatic hyperplasia with nocturia     Medications     Current Outpatient Prescriptions   Medication Sig Note   ? apixaban (ELIQUIS) 5 mg Tab tablet Take 5 mg by mouth 2 (two) times a day.    ? atorvastatin (LIPITOR) 80 MG tablet TAKE ONE TABLET BY MOUTH AT BEDTIME    ? calcium citrate-vitamin D3 250 mg calcium- 200 unit Tab Take 1 tablet by mouth 2 (two) times a day.    ? CARTIA  mg 24 hr capsule TAKE ONE CAPSULE BY MOUTH TWICE A DAY    ? cholecalciferol, vitamin D3, 5,000 unit Tab Take 5,000 Units by mouth bedtime. Take as directed.    ? clopidogrel (PLAVIX) 75 mg tablet TAKE ONE TABLET BY MOUTH EVERY DAY *PLEASE KEEP 7/17 APPT*    ? cyanocobalamin, vitamin B-12, 1,000 mcg Subl Place 1,000 mcg under the tongue daily.    ? dronedarone (FOR MULTAQ) 400 mg tablet Take 400 mg by mouth 2 (two) times a day.    ? olmesartan (BENICAR) 20 MG tablet Take 20 mg by mouth daily.    ? pediatric multivitamin (FLINTSTONES) Chew chewable tablet Chew 1 tablet 2 (two) times a day.    ? potassium chloride SA (K-DUR,KLOR-CON) 20 MEQ tablet Take 20 mEq by mouth 4 (four) times a day.     ? sertraline (ZOLOFT) 100 MG tablet Take 2.5 tablets (250 mg total) by mouth every evening.    ? tamsulosin (FLOMAX) 0.4 mg Cp24 Take 1 capsule (0.4 mg total) by mouth daily after supper.    ? ursodiol (ACTIGALL) 300 mg capsule Take 1 capsule (300 mg total) by mouth 2 (two) times a day. Start 2 wks after surgery. Do not cut, crush or open. Take with warm liquids. 8/7/2017: To start after surgery   ? dabigatran etexilate (PRADAXA) 150 mg capsu Take 150 mg by mouth. 2/7/2018: Received from: [facility] Received Sig: Take 1 capsule by mouth 2 times daily for 30 days. Take 1/12 thru 2/11, Resume Eliquis once Pradaxa completed.     Surgical History     Past Surgical History  He has a past surgical history that includes pr remv cataract extracap,insert lens; pr removal of heel spur (Right, 3/10/2015); Colonoscopy (2008); and pr lap, fortino restrict proc, longitudinal gastrectomy (N/A, 8/7/2017).    Objective-Exam     Constitutional:  BP (!) 146/92  Pulse 78  Resp 18  Ht 5' 11\" (1.803 m)  Wt 210 lb (95.3 kg)  SpO2 100%  BMI 29.29 kg/m2  Height: 5' 11\" (1.803 m) (2/14/2018  1:30 PM)  Initial Weight: 284 lbs (2/14/2018  1:30 PM)  Weight: 210 lb (95.3 kg) (2/14/2018  1:30 PM)  Weight loss from initial: 74 (2/14/2018  1:30 PM)  % Weight loss: 26.06 % (2/14/2018  1:30 PM)  BMI (Calculated): 29.3 (2/14/2018  1:30 PM)  SpO2: 100 % (2/14/2018  1:30 PM)  Heart Rate (/min): 62 (3/10/2017  8:08 AM)  BP (mmHg): 174/97 (3/10/2017  8:08 AM)  Waist Circumference (In): 42 Inches (2/14/2018  1:30 PM)  Hip Circumference (In): 41 Inches (2/14/2018  1:30 PM)  Neck Circumference (In): 16 Inches (2/14/2018  1:30 PM)  NSAIDS: Yes (3/10/2017  8:08 AM)  Drinks per week: 0 (3/10/2017  8:08 AM)  Pain Scale: 0 (3/10/2017  8:08 AM)  General:  Pleasant and in no acute distress   Eyes:  EOMI  ENT:  Airway 3+  Moist mucous membranes  Neck:  Supple, No LAD, No thyromegaly, No carotid bruits appreciated  Respiratory: Normal respiratory effort, no cough, wheezes or crackles  CV:  Regular rate and Rhythm,no murmurs, pulses 2+, no calf tenderness, no LE edema  Gastrointestinal: Abdomen NT/ND, BS+  Musculoskeletal: muscle mass WNL  Skin: color tan hair WNL, incisions nicely healed  Neurological: No tremor, normal gait  Psychiatric: alert and oriented X3, mood and affect normal    Counseling     We reviewed the important post op bariatric recommendations:  -eating 3 meals daily  -eating protein first, getting >60gm protein " daily  -eating slowly, chewing food well  -avoiding/limiting calorie containing beverages  -drinking water 15-30 minutes before or after meals  -choosing wheat, not white with breads, crackers, pastas, pardeep, bagels, tortillas, rice  -limiting restaurant or cafeteria eating to twice a week or less    We discussed the importance of restorative sleep and stress management in maintaining a healthy weight.  We discussed the National Weight Control Registry healthy weight maintenance strategies and ways to optimize metabolism.  We discussed the importance of physical activity including cardiovascular and strength training in maintaining a healthier weight.    We discussed the importance of life-long vitamin supplementation and life-long  follow-up.    Darius was reminded that, to avoid marginal ulcers he should avoid tobacco at all, alcohol in excess, caffeine in excess, and NSAIDS (unless indicated for cardioprotection or othewise and opposed by a PPI).    Yoko Arias MD, FAAFP  [facility].  2/14/2018  1:55 PM               30 minutes spent with patient. >50% in counseling and coordination of care.

## 2021-07-02 ENCOUNTER — RECORDS - HEALTHEAST (OUTPATIENT)
Dept: ADMINISTRATIVE | Facility: OTHER | Age: 66
End: 2021-07-02

## 2021-07-03 NOTE — ADDENDUM NOTE
Addendum Note by Yoko Neves MD at 8/8/2018  2:07 PM     Author: Yoko Neves MD Service: -- Author Type: Physician    Filed: 8/8/2018  2:07 PM Encounter Date: 7/11/2018 Status: Signed    : Yoko Neves MD (Physician)    Addended by: YOKO NEVES on: 8/8/2018 02:07 PM        Modules accepted: Orders

## 2021-07-03 NOTE — ADDENDUM NOTE
Addendum Note by eBrtha Stacy, RN at 8/4/2017  1:50 PM     Author: Bertha Stacy, RN Service: -- Author Type: Registered Nurse    Filed: 8/4/2017  1:50 PM Encounter Date: 8/4/2017 Status: Signed    : Bertha Stacy, RN (Registered Nurse)    Addended by: BERTHA STACY on: 8/4/2017 01:50 PM        Modules accepted: Orders

## 2021-07-03 NOTE — ADDENDUM NOTE
Addendum Note by Regis Gold MD at 6/5/2019  9:40 AM     Author: Regis Gold MD Service: -- Author Type: Physician    Filed: 6/5/2019 10:49 AM Encounter Date: 6/5/2019 Status: Signed    : Regis Gold MD (Physician)    Addended by: REGIS GOLD on: 6/5/2019 10:49 AM        Modules accepted: Orders

## 2021-07-03 NOTE — ADDENDUM NOTE
Addendum Note by Bertha Stacy, RN at 6/20/2017 10:08 AM     Author: Bertha Stacy RN Service: -- Author Type: Registered Nurse    Filed: 6/20/2017 10:08 AM Encounter Date: 6/20/2017 Status: Signed    : Bertha Stacy RN (Registered Nurse)    Addended by: BERTHA STACY on: 6/20/2017 10:08 AM        Modules accepted: Orders

## 2021-08-01 ENCOUNTER — HEALTH MAINTENANCE LETTER (OUTPATIENT)
Age: 66
End: 2021-08-01

## 2021-08-03 PROBLEM — E66.01 MORBID (SEVERE) OBESITY DUE TO EXCESS CALORIES (H): Status: RESOLVED | Noted: 2017-08-07 | Resolved: 2018-05-21

## 2021-09-26 ENCOUNTER — HEALTH MAINTENANCE LETTER (OUTPATIENT)
Age: 66
End: 2021-09-26

## 2022-02-15 NOTE — TELEPHONE ENCOUNTER
---------------------  From: Carmella Mancera   To: Malcolm Guevara MD;     Sent: 3/29/2021 9:40:52 AM CDT  Subject: HST     Patient has a home sleep study uploaded into [employer] ready for interpretation.---------------------  From: Malcolm Guevara MD   To: Carmella Mnacera;     Sent: 3/29/2021 10:05:52 AM CDT  Subject: RE: HST     done

## 2022-03-13 ENCOUNTER — HEALTH MAINTENANCE LETTER (OUTPATIENT)
Age: 67
End: 2022-03-13

## 2022-08-28 ENCOUNTER — HEALTH MAINTENANCE LETTER (OUTPATIENT)
Age: 67
End: 2022-08-28

## 2023-04-23 ENCOUNTER — HEALTH MAINTENANCE LETTER (OUTPATIENT)
Age: 68
End: 2023-04-23

## 2023-09-24 ENCOUNTER — HEALTH MAINTENANCE LETTER (OUTPATIENT)
Age: 68
End: 2023-09-24

## 2023-12-03 ENCOUNTER — HEALTH MAINTENANCE LETTER (OUTPATIENT)
Age: 68
End: 2023-12-03